# Patient Record
Sex: FEMALE | Race: OTHER | Employment: UNEMPLOYED | URBAN - METROPOLITAN AREA
[De-identification: names, ages, dates, MRNs, and addresses within clinical notes are randomized per-mention and may not be internally consistent; named-entity substitution may affect disease eponyms.]

---

## 2017-02-07 ENCOUNTER — OFFICE VISIT (OUTPATIENT)
Dept: FAMILY MEDICINE CLINIC | Facility: CLINIC | Age: 52
End: 2017-02-07

## 2017-02-07 VITALS
WEIGHT: 154 LBS | SYSTOLIC BLOOD PRESSURE: 184 MMHG | BODY MASS INDEX: 30 KG/M2 | DIASTOLIC BLOOD PRESSURE: 120 MMHG | HEART RATE: 73 BPM | TEMPERATURE: 98 F

## 2017-02-07 DIAGNOSIS — I10 ESSENTIAL HYPERTENSION: ICD-10-CM

## 2017-02-07 DIAGNOSIS — N30.00 ACUTE CYSTITIS WITHOUT HEMATURIA: ICD-10-CM

## 2017-02-07 DIAGNOSIS — Z12.39 BREAST CANCER SCREENING: ICD-10-CM

## 2017-02-07 DIAGNOSIS — M54.6 ACUTE RIGHT-SIDED THORACIC BACK PAIN: Primary | ICD-10-CM

## 2017-02-07 LAB
APPEARANCE: CLEAR
BILIRUBIN: NEGATIVE
GLUCOSE (URINE DIPSTICK): NEGATIVE MG/DL
KETONES (URINE DIPSTICK): NEGATIVE MG/DL
MULTISTIX LOT#: ABNORMAL NUMERIC
NITRITE, URINE: NEGATIVE
OCCULT BLOOD: NEGATIVE
PH, URINE: 8.5 (ref 4.5–8)
SPECIFIC GRAVITY: 1 (ref 1–1.03)
URINE-COLOR: YELLOW
UROBILINOGEN,SEMI-QN: 0.2 MG/DL (ref 0–1.9)

## 2017-02-07 PROCEDURE — 99213 OFFICE O/P EST LOW 20 MIN: CPT | Performed by: FAMILY MEDICINE

## 2017-02-07 PROCEDURE — 81002 URINALYSIS NONAUTO W/O SCOPE: CPT | Performed by: FAMILY MEDICINE

## 2017-02-07 PROCEDURE — 99214 OFFICE O/P EST MOD 30 MIN: CPT | Performed by: FAMILY MEDICINE

## 2017-02-07 RX ORDER — OMEGA-3-ACID ETHYL ESTERS 1 G/1
CAPSULE, LIQUID FILLED ORAL
Refills: 4 | COMMUNITY
Start: 2016-11-23 | End: 2018-07-19

## 2017-02-07 RX ORDER — CIPROFLOXACIN 500 MG/1
500 TABLET, FILM COATED ORAL 2 TIMES DAILY
Qty: 20 TABLET | Refills: 0 | Status: SHIPPED | OUTPATIENT
Start: 2017-02-07 | End: 2017-02-17

## 2017-02-07 RX ORDER — CYCLOBENZAPRINE HCL 10 MG
10 TABLET ORAL 3 TIMES DAILY
Qty: 30 TABLET | Refills: 1 | Status: SHIPPED | OUTPATIENT
Start: 2017-02-07 | End: 2017-02-27

## 2017-02-07 RX ORDER — IBUPROFEN 600 MG/1
600 TABLET ORAL EVERY 6 HOURS PRN
Qty: 60 TABLET | Refills: 0 | Status: SHIPPED | OUTPATIENT
Start: 2017-02-07 | End: 2017-02-27

## 2017-02-07 RX ORDER — CAPTOPRIL 25 MG/1
25 TABLET ORAL 2 TIMES DAILY
COMMUNITY
End: 2017-02-27

## 2017-02-07 RX ORDER — VALSARTAN 80 MG/1
TABLET ORAL
Qty: 30 TABLET | Refills: 2 | Status: SHIPPED | OUTPATIENT
Start: 2017-02-07 | End: 2017-09-24

## 2017-02-07 NOTE — PROGRESS NOTES
2/7/2017  10:06 AM    Melissa Naranjo is a 46year old female. Chief complaint(s): Patient presents with:  Back Pain: Patient here c/o right lower back pain. Started 4 days ago. She has been using Ibuprofen with some relief.      HPI:     Igor Lopez Alcohol Use: No                 Immunizations:     Immunization History  Administered            Date(s) Administered    Fluvirin, 3 Years & >, Im                          11/17/2012      Influenza Vaccine, No Preserv, 3YR +                          10/17/ Ear: External ear normal.   Left Ear: External ear normal.   Nose: No rhinorrhea. Mouth/Throat: Oropharynx is clear and moist.   Eyes: Conjunctivae are normal.   Neck: Neck supple. Cardiovascular: Normal rate and regular rhythm.     Pulmonary/Chest: Eff it with food. Cyclobenzaprine HCl 10 MG Oral Tab 30 tablet 1      Sig: Take 1 tablet (10 mg total) by mouth 3 (three) times daily. Lexi Benitez    LABORATORY & ORDERS:   Orders Placed This Encounter  POC Urinalysis, Manual Dip without microscopy [[id number]]  Ur concerns. Notify Dr Neeta Partida or the [facility] if there is a deterioration or worsening of the medical condition. Also, inform the doctor with any new symptoms or medications' side effects. Self breast exams every month.     FOLLOW-UP: Schedule a foll

## 2017-02-27 ENCOUNTER — OFFICE VISIT (OUTPATIENT)
Dept: FAMILY MEDICINE CLINIC | Facility: CLINIC | Age: 52
End: 2017-02-27

## 2017-02-27 VITALS
WEIGHT: 155 LBS | DIASTOLIC BLOOD PRESSURE: 88 MMHG | SYSTOLIC BLOOD PRESSURE: 140 MMHG | HEART RATE: 88 BPM | TEMPERATURE: 98 F | BODY MASS INDEX: 30 KG/M2

## 2017-02-27 DIAGNOSIS — I10 ESSENTIAL HYPERTENSION: Primary | ICD-10-CM

## 2017-02-27 DIAGNOSIS — M54.6 ACUTE RIGHT-SIDED THORACIC BACK PAIN: ICD-10-CM

## 2017-02-27 PROCEDURE — 99214 OFFICE O/P EST MOD 30 MIN: CPT | Performed by: FAMILY MEDICINE

## 2017-02-27 PROCEDURE — 99212 OFFICE O/P EST SF 10 MIN: CPT | Performed by: FAMILY MEDICINE

## 2017-02-27 NOTE — PROGRESS NOTES
2/27/2017  4:49 PM    Raysa Melendez is a 46year old female. Chief complaint(s): Patient presents with: Follow - Up  Back Pain    HPI:     Paigesilvio Arreolaeger primary complaint is regarding back pain.      Raysa Melendez is a 46 year 11/17/2012      Influenza Vaccine, No Preserv, 3YR +                          10/17/2016      TD                    04/09/2011      TDAP                  08/20/2016      Medications (Active prior to today's visit):    Current Outpatient Prescriptions:  Om 02/07/17  -URINALYSIS NONAUTO W/O SCOPE   Result Value Ref Range   GLUCOSE (URINE DIPSTICK) NEGATIVE Negative mg/dL   BILIRUBIN NEGATIVE Negative   KETONES (URINE DIPSTICK) NEGATIVE Negative mg/dL   SPECIFIC GRAVITY 1.000 (A) 1.005 - 1.030   OCCULT BLOOD N this encounter.        Meds This Visit:    No prescriptions requested or ordered in this encounter    Imaging & Referrals:  None         Levon Hurd MD

## 2017-03-15 ENCOUNTER — APPOINTMENT (OUTPATIENT)
Dept: CARDIOLOGY | Facility: CLINIC | Age: 52
End: 2017-03-15

## 2017-07-18 ENCOUNTER — HOSPITAL ENCOUNTER (OUTPATIENT)
Dept: MAMMOGRAPHY | Age: 52
Discharge: HOME | End: 2017-07-18
Attending: FAMILY MEDICINE
Payer: COMMERCIAL

## 2017-07-18 DIAGNOSIS — Z12.39 BREAST CANCER SCREENING: ICD-10-CM

## 2017-07-18 PROCEDURE — 77067 SCR MAMMO BI INCL CAD: CPT | Performed by: FAMILY MEDICINE

## 2017-09-24 RX ORDER — VALSARTAN 80 MG/1
TABLET ORAL
Qty: 90 TABLET | Refills: 1 | Status: SHIPPED | OUTPATIENT
Start: 2017-09-24 | End: 2018-03-07

## 2018-02-14 ENCOUNTER — OFFICE VISIT (OUTPATIENT)
Dept: FAMILY MEDICINE CLINIC | Facility: CLINIC | Age: 53
End: 2018-02-14

## 2018-02-14 ENCOUNTER — LAB ENCOUNTER (OUTPATIENT)
Dept: LAB | Age: 53
End: 2018-02-14
Attending: FAMILY MEDICINE
Payer: COMMERCIAL

## 2018-02-14 ENCOUNTER — APPOINTMENT (OUTPATIENT)
Dept: LAB | Age: 53
End: 2018-02-14
Attending: FAMILY MEDICINE
Payer: COMMERCIAL

## 2018-02-14 VITALS
BODY MASS INDEX: 30.43 KG/M2 | HEART RATE: 69 BPM | WEIGHT: 155 LBS | SYSTOLIC BLOOD PRESSURE: 173 MMHG | TEMPERATURE: 98 F | DIASTOLIC BLOOD PRESSURE: 113 MMHG | HEIGHT: 60 IN

## 2018-02-14 DIAGNOSIS — Z00.00 PHYSICAL EXAM: Primary | ICD-10-CM

## 2018-02-14 DIAGNOSIS — Z00.00 PHYSICAL EXAM: ICD-10-CM

## 2018-02-14 DIAGNOSIS — Z12.11 COLON CANCER SCREENING: ICD-10-CM

## 2018-02-14 DIAGNOSIS — I10 ESSENTIAL HYPERTENSION: ICD-10-CM

## 2018-02-14 LAB
25(OH)D3 SERPL-MCNC: 20.1 NG/ML
ALBUMIN SERPL BCP-MCNC: 4.1 G/DL (ref 3.5–4.8)
ALBUMIN/GLOB SERPL: 1.3 {RATIO} (ref 1–2)
ALP SERPL-CCNC: 81 U/L (ref 32–100)
ALT SERPL-CCNC: 19 U/L (ref 14–54)
ANION GAP SERPL CALC-SCNC: 9 MMOL/L (ref 0–18)
AST SERPL-CCNC: 18 U/L (ref 15–41)
BASOPHILS # BLD: 0.1 K/UL (ref 0–0.2)
BASOPHILS NFR BLD: 1 %
BILIRUB SERPL-MCNC: 0.4 MG/DL (ref 0.3–1.2)
BILIRUB UR QL: NEGATIVE
BUN SERPL-MCNC: 14 MG/DL (ref 8–20)
BUN/CREAT SERPL: 14.3 (ref 10–20)
CALCIUM SERPL-MCNC: 9.5 MG/DL (ref 8.5–10.5)
CANCER AG125 SERPL-ACNC: 15 U/ML (ref 0–35)
CHLORIDE SERPL-SCNC: 105 MMOL/L (ref 95–110)
CHOLEST SERPL-MCNC: 229 MG/DL (ref 110–200)
CLARITY UR: CLEAR
CO2 SERPL-SCNC: 26 MMOL/L (ref 22–32)
COLOR UR: YELLOW
CREAT SERPL-MCNC: 0.98 MG/DL (ref 0.5–1.5)
EOSINOPHIL # BLD: 0.2 K/UL (ref 0–0.7)
EOSINOPHIL NFR BLD: 5 %
ERYTHROCYTE [DISTWIDTH] IN BLOOD BY AUTOMATED COUNT: 13.4 % (ref 11–15)
GLOBULIN PLAS-MCNC: 3.1 G/DL (ref 2.5–3.7)
GLUCOSE SERPL-MCNC: 87 MG/DL (ref 70–99)
GLUCOSE UR-MCNC: NEGATIVE MG/DL
HBA1C MFR BLD: 5.7 % (ref 4–6)
HCT VFR BLD AUTO: 41.7 % (ref 35–48)
HDLC SERPL-MCNC: 46 MG/DL
HGB BLD-MCNC: 14 G/DL (ref 12–16)
KETONES UR-MCNC: NEGATIVE MG/DL
LDLC SERPL CALC-MCNC: 155 MG/DL (ref 0–99)
LYMPHOCYTES # BLD: 1.5 K/UL (ref 1–4)
LYMPHOCYTES NFR BLD: 33 %
MCH RBC QN AUTO: 28.1 PG (ref 27–32)
MCHC RBC AUTO-ENTMCNC: 33.6 G/DL (ref 32–37)
MCV RBC AUTO: 83.8 FL (ref 80–100)
MONOCYTES # BLD: 0.3 K/UL (ref 0–1)
MONOCYTES NFR BLD: 7 %
NEUTROPHILS # BLD AUTO: 2.5 K/UL (ref 1.8–7.7)
NEUTROPHILS NFR BLD: 54 %
NITRITE UR QL STRIP.AUTO: NEGATIVE
NONHDLC SERPL-MCNC: 183 MG/DL
OSMOLALITY UR CALC.SUM OF ELEC: 290 MOSM/KG (ref 275–295)
PATIENT FASTING: YES
PH UR: 5 [PH] (ref 5–8)
PLATELET # BLD AUTO: 244 K/UL (ref 140–400)
PMV BLD AUTO: 7.9 FL (ref 7.4–10.3)
POTASSIUM SERPL-SCNC: 3.9 MMOL/L (ref 3.3–5.1)
PROT SERPL-MCNC: 7.2 G/DL (ref 5.9–8.4)
PROT UR-MCNC: NEGATIVE MG/DL
RBC # BLD AUTO: 4.98 M/UL (ref 3.7–5.4)
RBC #/AREA URNS AUTO: 1 /HPF
RBC #/AREA URNS AUTO: 1 /HPF
SODIUM SERPL-SCNC: 140 MMOL/L (ref 136–144)
SP GR UR STRIP: 1.01 (ref 1–1.03)
TRIGL SERPL-MCNC: 140 MG/DL (ref 1–149)
TSH SERPL-ACNC: 2.86 UIU/ML (ref 0.45–5.33)
UROBILINOGEN UR STRIP-ACNC: <2
VIT C UR-MCNC: NEGATIVE MG/DL
WBC # BLD AUTO: 4.7 K/UL (ref 4–11)
WBC #/AREA URNS AUTO: 1 /HPF
WBC #/AREA URNS AUTO: 2 /HPF

## 2018-02-14 PROCEDURE — 84443 ASSAY THYROID STIM HORMONE: CPT

## 2018-02-14 PROCEDURE — 80053 COMPREHEN METABOLIC PANEL: CPT

## 2018-02-14 PROCEDURE — 82306 VITAMIN D 25 HYDROXY: CPT | Performed by: FAMILY MEDICINE

## 2018-02-14 PROCEDURE — 99396 PREV VISIT EST AGE 40-64: CPT | Performed by: FAMILY MEDICINE

## 2018-02-14 PROCEDURE — 86304 IMMUNOASSAY TUMOR CA 125: CPT

## 2018-02-14 PROCEDURE — 85025 COMPLETE CBC W/AUTO DIFF WBC: CPT

## 2018-02-14 PROCEDURE — 93010 ELECTROCARDIOGRAM REPORT: CPT | Performed by: FAMILY MEDICINE

## 2018-02-14 PROCEDURE — 36415 COLL VENOUS BLD VENIPUNCTURE: CPT

## 2018-02-14 PROCEDURE — 81001 URINALYSIS AUTO W/SCOPE: CPT

## 2018-02-14 PROCEDURE — 83036 HEMOGLOBIN GLYCOSYLATED A1C: CPT

## 2018-02-14 PROCEDURE — 80061 LIPID PANEL: CPT

## 2018-02-14 PROCEDURE — 81015 MICROSCOPIC EXAM OF URINE: CPT | Performed by: FAMILY MEDICINE

## 2018-02-14 PROCEDURE — 93005 ELECTROCARDIOGRAM TRACING: CPT

## 2018-02-14 RX ORDER — ERGOCALCIFEROL 1.25 MG/1
50000 CAPSULE ORAL WEEKLY
Qty: 12 CAPSULE | Refills: 4 | Status: SHIPPED | OUTPATIENT
Start: 2018-02-14 | End: 2018-03-16

## 2018-02-14 RX ORDER — BENAZEPRIL HYDROCHLORIDE 20 MG/1
20 TABLET ORAL DAILY
Qty: 90 TABLET | Refills: 1 | Status: SHIPPED | OUTPATIENT
Start: 2018-02-14 | End: 2018-07-19

## 2018-02-14 NOTE — PROGRESS NOTES
2/14/2018  8:25 AM    Sinan Escalante is a 46year old female. Chief complaint(s): Patient presents with:  Routine Physical    HPI:     Sinan Escalante primary complaint is regarding CPE.      Sinan Escalante is a 46year old female Medications (Active prior to today's visit):    Current Outpatient Prescriptions:  Benazepril HCl (LOTENSIN) 20 MG Oral Tab Take 1 tablet (20 mg total) by mouth daily.  Disp: 90 tablet Rfl: 1   Omega-3-acid Ethyl Esters 1 g Oral Cap TAKE 2 CAPSULES BY M 30.27 kg/m²    HENT:   Normocephalic, Normal red light reflex bilaterally, pupils equally reactive to light and accommodation, none icteric sclera. Normal tympanic membranes with normal light reflex bilaterally.  Normal nasal septum, throat clear without l screening    Assessment and Plan:     [place] checkup as follows:    LABORATORY & ORDERS:   Orders Placed This Encounter      CBC W Differential W Platelet [E]      Comp Metabolic Panel (14) [E]      Hemoglobin A1C [E]      Lipid Pane Differential W Platelet [E]      Comp Metabolic Panel (14) [E]      Hemoglobin A1C [E]      Lipid Panel [E]      TSH W Reflex To Free T4 [E]      Urinalysis, Routine [E]      Urine Microscopic w Reflex CULTURE      Vitamin D, 25-Hydroxy [E]      -II

## 2018-02-15 LAB
C TRACH DNA SPEC QL NAA+PROBE: NEGATIVE
N GONORRHOEA DNA SPEC QL NAA+PROBE: NEGATIVE

## 2018-02-15 RX ORDER — OLOPATADINE HYDROCHLORIDE 1 MG/ML
2 SOLUTION/ DROPS OPHTHALMIC 2 TIMES DAILY
Qty: 1 BOTTLE | Refills: 4 | Status: SHIPPED | OUTPATIENT
Start: 2018-02-15 | End: 2018-07-19

## 2018-02-16 LAB — HPV I/H RISK 1 DNA SPEC QL NAA+PROBE: NEGATIVE

## 2018-02-17 ENCOUNTER — TELEPHONE (OUTPATIENT)
Dept: OTHER | Age: 53
End: 2018-02-17

## 2018-02-17 NOTE — TELEPHONE ENCOUNTER
Notes Recorded by Anh Smith RN on 2/17/2018 at 1:27 PM CST  2/17 No answer see telephone encounter.   ------    Notes Recorded by Kvng Chery MD on 2/16/2018 at 9:32 PM CST  Please call patient, results are within normal limits.  ------    No

## 2018-02-24 NOTE — TELEPHONE ENCOUNTER
Pt returned call, verified name and  with [employer] assistance agent # [id number]. Reviewed all test results and recommendations with pt per doctor's instructions. Pt has already picked up the Vitamin D prescription.  Pt had no further questions at this

## 2018-03-07 ENCOUNTER — OFFICE VISIT (OUTPATIENT)
Dept: FAMILY MEDICINE CLINIC | Facility: CLINIC | Age: 53
End: 2018-03-07

## 2018-03-07 VITALS
WEIGHT: 153 LBS | BODY MASS INDEX: 30 KG/M2 | TEMPERATURE: 98 F | SYSTOLIC BLOOD PRESSURE: 138 MMHG | DIASTOLIC BLOOD PRESSURE: 86 MMHG | HEART RATE: 66 BPM

## 2018-03-07 DIAGNOSIS — J11.1 INFLUENZA: Primary | ICD-10-CM

## 2018-03-07 PROCEDURE — 99212 OFFICE O/P EST SF 10 MIN: CPT | Performed by: FAMILY MEDICINE

## 2018-03-07 PROCEDURE — 99213 OFFICE O/P EST LOW 20 MIN: CPT | Performed by: FAMILY MEDICINE

## 2018-03-07 RX ORDER — FLUTICASONE PROPIONATE 50 MCG
2 SPRAY, SUSPENSION (ML) NASAL DAILY
Qty: 1 INHALER | Refills: 3 | Status: SHIPPED | OUTPATIENT
Start: 2018-03-07 | End: 2018-07-19 | Stop reason: ALTCHOICE

## 2018-03-07 RX ORDER — CODEINE PHOSPHATE AND GUAIFENESIN 10; 100 MG/5ML; MG/5ML
5 SOLUTION ORAL EVERY 6 HOURS PRN
Qty: 120 ML | Refills: 1 | Status: SHIPPED | OUTPATIENT
Start: 2018-03-07 | End: 2018-07-19 | Stop reason: ALTCHOICE

## 2018-03-07 RX ORDER — OSELTAMIVIR PHOSPHATE 75 MG/1
75 CAPSULE ORAL 2 TIMES DAILY
Qty: 10 CAPSULE | Refills: 0 | Status: SHIPPED | OUTPATIENT
Start: 2018-03-07 | End: 2018-07-19 | Stop reason: ALTCHOICE

## 2018-03-07 NOTE — PROGRESS NOTES
3/7/2018 10:27 AM    Sissy Interiano, : 11/15/1965  Patient presents with:  Cough: X 2 days  Sore Throat  Body ache and/or chills  Ear Pain    HPI:     Sissy Interiano is a 46year old female who presents for evaluation of a chief complain LEFT  2015: OTHER SURGICAL HISTORY      Comment: left breast core biopsy    Social History:     Social History  Social History   Marital status:   Spouse name: N/A    Years of education: N/A  Number of children: N/A     Occupational History  None on normocephalic, atraumatic  EYES: sclera non icteric bilateral, conjunctiva clear  EARS: TM  bilateral: normal  NOSE: nasal turbinates: pink, normal mucosa  THROAT: clear, without exudates and redness noted  LUNGS: clear to auscultation bilaterally; no rale Refill:  0      guaiFENesin-codeine (CHERATUSSIN AC) 100-10 MG/5ML Oral Solution          Sig: Take 5 mL by mouth every 6 (six) hours as needed for cough.           Dispense:  120 mL          Refill:  1      Fluticasone Propionate 50 MCG/ACT Nasal Suspens

## 2018-05-07 ENCOUNTER — EMERGENCY (EMERGENCY)
Facility: HOSPITAL | Age: 53
LOS: 1 days | Discharge: HOME | End: 2018-05-07
Attending: EMERGENCY MEDICINE
Payer: MEDICAID

## 2018-05-07 VITALS
WEIGHT: 160.06 LBS | SYSTOLIC BLOOD PRESSURE: 108 MMHG | RESPIRATION RATE: 20 BRPM | TEMPERATURE: 98 F | HEART RATE: 67 BPM | DIASTOLIC BLOOD PRESSURE: 63 MMHG | OXYGEN SATURATION: 100 %

## 2018-05-07 DIAGNOSIS — Z98.89 OTHER SPECIFIED POSTPROCEDURAL STATES: Chronic | ICD-10-CM

## 2018-05-07 LAB
ALBUMIN SERPL ELPH-MCNC: 3.3 G/DL — LOW (ref 3.5–5)
ALP SERPL-CCNC: 124 U/L — HIGH (ref 40–120)
ALT FLD-CCNC: 76 U/L DA — HIGH (ref 10–60)
ANION GAP SERPL CALC-SCNC: 5 MMOL/L — SIGNIFICANT CHANGE UP (ref 5–17)
APPEARANCE UR: CLEAR — SIGNIFICANT CHANGE UP
APPEARANCE UR: CLEAR — SIGNIFICANT CHANGE UP
AST SERPL-CCNC: 38 U/L — SIGNIFICANT CHANGE UP (ref 10–40)
BASOPHILS # BLD AUTO: 0.1 K/UL — SIGNIFICANT CHANGE UP (ref 0–0.2)
BASOPHILS NFR BLD AUTO: 1.5 % — SIGNIFICANT CHANGE UP (ref 0–2)
BILIRUB SERPL-MCNC: 0.5 MG/DL — SIGNIFICANT CHANGE UP (ref 0.2–1.2)
BILIRUB UR-MCNC: NEGATIVE — SIGNIFICANT CHANGE UP
BILIRUB UR-MCNC: NEGATIVE — SIGNIFICANT CHANGE UP
BUN SERPL-MCNC: 24 MG/DL — HIGH (ref 7–18)
CALCIUM SERPL-MCNC: 8.5 MG/DL — SIGNIFICANT CHANGE UP (ref 8.4–10.5)
CHLORIDE SERPL-SCNC: 106 MMOL/L — SIGNIFICANT CHANGE UP (ref 96–108)
CO2 SERPL-SCNC: 28 MMOL/L — SIGNIFICANT CHANGE UP (ref 22–31)
COLOR SPEC: YELLOW — SIGNIFICANT CHANGE UP
COLOR SPEC: YELLOW — SIGNIFICANT CHANGE UP
CREAT SERPL-MCNC: 1.51 MG/DL — HIGH (ref 0.5–1.3)
DIFF PNL FLD: ABNORMAL
DIFF PNL FLD: ABNORMAL
EOSINOPHIL # BLD AUTO: 0.2 K/UL — SIGNIFICANT CHANGE UP (ref 0–0.5)
EOSINOPHIL NFR BLD AUTO: 3.5 % — SIGNIFICANT CHANGE UP (ref 0–6)
EPI CELLS # UR: SIGNIFICANT CHANGE UP /HPF
EPI CELLS # UR: SIGNIFICANT CHANGE UP /HPF
GLUCOSE SERPL-MCNC: 245 MG/DL — HIGH (ref 70–99)
GLUCOSE UR QL: 50 MG/DL
GLUCOSE UR QL: 50 MG/DL
HCG UR QL: NEGATIVE — SIGNIFICANT CHANGE UP
HCT VFR BLD CALC: 39.9 % — SIGNIFICANT CHANGE UP (ref 34.5–45)
HGB BLD-MCNC: 12.7 G/DL — SIGNIFICANT CHANGE UP (ref 11.5–15.5)
KETONES UR-MCNC: NEGATIVE — SIGNIFICANT CHANGE UP
KETONES UR-MCNC: NEGATIVE — SIGNIFICANT CHANGE UP
LACTATE SERPL-SCNC: 1.2 MMOL/L — SIGNIFICANT CHANGE UP (ref 0.7–2)
LEUKOCYTE ESTERASE UR-ACNC: ABNORMAL
LEUKOCYTE ESTERASE UR-ACNC: ABNORMAL
LYMPHOCYTES # BLD AUTO: 2.4 K/UL — SIGNIFICANT CHANGE UP (ref 1–3.3)
LYMPHOCYTES # BLD AUTO: 46 % — HIGH (ref 13–44)
MCHC RBC-ENTMCNC: 28 PG — SIGNIFICANT CHANGE UP (ref 27–34)
MCHC RBC-ENTMCNC: 31.8 GM/DL — LOW (ref 32–36)
MCV RBC AUTO: 88 FL — SIGNIFICANT CHANGE UP (ref 80–100)
MONOCYTES # BLD AUTO: 0.4 K/UL — SIGNIFICANT CHANGE UP (ref 0–0.9)
MONOCYTES NFR BLD AUTO: 6.8 % — SIGNIFICANT CHANGE UP (ref 2–14)
NEUTROPHILS # BLD AUTO: 2.2 K/UL — SIGNIFICANT CHANGE UP (ref 1.8–7.4)
NEUTROPHILS NFR BLD AUTO: 42.2 % — LOW (ref 43–77)
NITRITE UR-MCNC: NEGATIVE — SIGNIFICANT CHANGE UP
NITRITE UR-MCNC: NEGATIVE — SIGNIFICANT CHANGE UP
PH UR: 7 — SIGNIFICANT CHANGE UP (ref 5–8)
PH UR: 7 — SIGNIFICANT CHANGE UP (ref 5–8)
PLATELET # BLD AUTO: 248 K/UL — SIGNIFICANT CHANGE UP (ref 150–400)
POTASSIUM SERPL-MCNC: 3.7 MMOL/L — SIGNIFICANT CHANGE UP (ref 3.5–5.3)
POTASSIUM SERPL-SCNC: 3.7 MMOL/L — SIGNIFICANT CHANGE UP (ref 3.5–5.3)
PROT SERPL-MCNC: 7 G/DL — SIGNIFICANT CHANGE UP (ref 6–8.3)
PROT UR-MCNC: 15
PROT UR-MCNC: NEGATIVE — SIGNIFICANT CHANGE UP
RBC # BLD: 4.53 M/UL — SIGNIFICANT CHANGE UP (ref 3.8–5.2)
RBC # FLD: 12.4 % — SIGNIFICANT CHANGE UP (ref 10.3–14.5)
RBC CASTS # UR COMP ASSIST: ABNORMAL /HPF (ref 0–2)
RBC CASTS # UR COMP ASSIST: ABNORMAL /HPF (ref 0–2)
SODIUM SERPL-SCNC: 139 MMOL/L — SIGNIFICANT CHANGE UP (ref 135–145)
SP GR SPEC: 1.01 — SIGNIFICANT CHANGE UP (ref 1.01–1.02)
SP GR SPEC: 1.01 — SIGNIFICANT CHANGE UP (ref 1.01–1.02)
UROBILINOGEN FLD QL: NEGATIVE — SIGNIFICANT CHANGE UP
UROBILINOGEN FLD QL: NEGATIVE — SIGNIFICANT CHANGE UP
WBC # BLD: 5.3 K/UL — SIGNIFICANT CHANGE UP (ref 3.8–10.5)
WBC # FLD AUTO: 5.3 K/UL — SIGNIFICANT CHANGE UP (ref 3.8–10.5)
WBC UR QL: SIGNIFICANT CHANGE UP /HPF (ref 0–5)
WBC UR QL: SIGNIFICANT CHANGE UP /HPF (ref 0–5)

## 2018-05-07 PROCEDURE — 74176 CT ABD & PELVIS W/O CONTRAST: CPT | Mod: 26

## 2018-05-07 PROCEDURE — 87086 URINE CULTURE/COLONY COUNT: CPT

## 2018-05-07 PROCEDURE — 99284 EMERGENCY DEPT VISIT MOD MDM: CPT | Mod: 25

## 2018-05-07 PROCEDURE — 99285 EMERGENCY DEPT VISIT HI MDM: CPT

## 2018-05-07 PROCEDURE — 74176 CT ABD & PELVIS W/O CONTRAST: CPT

## 2018-05-07 PROCEDURE — 96374 THER/PROPH/DIAG INJ IV PUSH: CPT

## 2018-05-07 PROCEDURE — 81025 URINE PREGNANCY TEST: CPT

## 2018-05-07 PROCEDURE — 80053 COMPREHEN METABOLIC PANEL: CPT

## 2018-05-07 PROCEDURE — 85027 COMPLETE CBC AUTOMATED: CPT

## 2018-05-07 PROCEDURE — 96375 TX/PRO/DX INJ NEW DRUG ADDON: CPT

## 2018-05-07 PROCEDURE — 83605 ASSAY OF LACTIC ACID: CPT

## 2018-05-07 PROCEDURE — 81001 URINALYSIS AUTO W/SCOPE: CPT

## 2018-05-07 PROCEDURE — 87040 BLOOD CULTURE FOR BACTERIA: CPT

## 2018-05-07 RX ORDER — SODIUM CHLORIDE 9 MG/ML
1000 INJECTION INTRAMUSCULAR; INTRAVENOUS; SUBCUTANEOUS ONCE
Qty: 0 | Refills: 0 | Status: COMPLETED | OUTPATIENT
Start: 2018-05-07 | End: 2018-05-07

## 2018-05-07 RX ORDER — SODIUM CHLORIDE 9 MG/ML
3 INJECTION INTRAMUSCULAR; INTRAVENOUS; SUBCUTANEOUS ONCE
Qty: 0 | Refills: 0 | Status: COMPLETED | OUTPATIENT
Start: 2018-05-07 | End: 2018-05-07

## 2018-05-07 RX ORDER — CEFTRIAXONE 500 MG/1
1 INJECTION, POWDER, FOR SOLUTION INTRAMUSCULAR; INTRAVENOUS ONCE
Qty: 0 | Refills: 0 | Status: COMPLETED | OUTPATIENT
Start: 2018-05-07 | End: 2018-05-07

## 2018-05-07 RX ORDER — KETOROLAC TROMETHAMINE 30 MG/ML
15 SYRINGE (ML) INJECTION ONCE
Qty: 0 | Refills: 0 | Status: DISCONTINUED | OUTPATIENT
Start: 2018-05-07 | End: 2018-05-07

## 2018-05-07 RX ADMIN — SODIUM CHLORIDE 3 MILLILITER(S): 9 INJECTION INTRAMUSCULAR; INTRAVENOUS; SUBCUTANEOUS at 10:00

## 2018-05-07 RX ADMIN — SODIUM CHLORIDE 1000 MILLILITER(S): 9 INJECTION INTRAMUSCULAR; INTRAVENOUS; SUBCUTANEOUS at 10:08

## 2018-05-07 RX ADMIN — Medication 15 MILLIGRAM(S): at 10:08

## 2018-05-07 RX ADMIN — CEFTRIAXONE 100 GRAM(S): 500 INJECTION, POWDER, FOR SOLUTION INTRAMUSCULAR; INTRAVENOUS at 10:08

## 2018-05-07 NOTE — ED PROVIDER NOTE - MEDICAL DECISION MAKING DETAILS
lt flank pain, f/c, nausea on po abx-CT, IV abx, UA reassess, concern for uti, renal stones lt flank pain, f/c, nausea on po abx-CT, IV abx, UA reassess, concern for uti, renal stones, back strain

## 2018-05-07 NOTE — ED PROVIDER NOTE - PROGRESS NOTE DETAILS
Educated pt on results. Pt aware of polycystic kidneys, avoid nsaids. Instructed to complete PO abx, f/u PMD. Answered q's.

## 2018-05-07 NOTE — ED PROVIDER NOTE - OBJECTIVE STATEMENT
53 yo F h/o DMII, UTI, Kidney Stones p/w left flank pain, dysuria x 1 week. Pt also c/o sharp pain to lt side abd, f/c over same time. Pt was seen at [place] [facility] [place] [facility] 3 days ago and started on PO abx. Pt went again yesterday and left after waiting. Pt relates h/o renal stones requiring procedure last year. No cp/sob/dizziness or vomiting. NKDA

## 2018-05-07 NOTE — ED ADULT NURSE NOTE - OBJECTIVE STATEMENT
Patient came to the ED a/o x 3 ambulates c/o left lower back pain. Patient states she is also having burning in urination.

## 2018-05-08 LAB
CULTURE RESULTS: SIGNIFICANT CHANGE UP
SPECIMEN SOURCE: SIGNIFICANT CHANGE UP

## 2018-05-12 LAB
CULTURE RESULTS: SIGNIFICANT CHANGE UP
CULTURE RESULTS: SIGNIFICANT CHANGE UP
SPECIMEN SOURCE: SIGNIFICANT CHANGE UP
SPECIMEN SOURCE: SIGNIFICANT CHANGE UP

## 2018-06-30 ENCOUNTER — INPATIENT (INPATIENT)
Facility: HOSPITAL | Age: 53
LOS: 3 days | Discharge: HOME | DRG: 690 | End: 2018-07-04
Attending: INTERNAL MEDICINE | Admitting: INTERNAL MEDICINE
Payer: MEDICAID

## 2018-06-30 VITALS
RESPIRATION RATE: 16 BRPM | HEART RATE: 100 BPM | SYSTOLIC BLOOD PRESSURE: 111 MMHG | WEIGHT: 177.91 LBS | OXYGEN SATURATION: 98 % | TEMPERATURE: 100 F | DIASTOLIC BLOOD PRESSURE: 77 MMHG

## 2018-06-30 DIAGNOSIS — Z98.89 OTHER SPECIFIED POSTPROCEDURAL STATES: Chronic | ICD-10-CM

## 2018-06-30 DIAGNOSIS — N12 TUBULO-INTERSTITIAL NEPHRITIS, NOT SPECIFIED AS ACUTE OR CHRONIC: ICD-10-CM

## 2018-06-30 DIAGNOSIS — E87.6 HYPOKALEMIA: ICD-10-CM

## 2018-06-30 DIAGNOSIS — E11.9 TYPE 2 DIABETES MELLITUS WITHOUT COMPLICATIONS: ICD-10-CM

## 2018-06-30 DIAGNOSIS — Z29.9 ENCOUNTER FOR PROPHYLACTIC MEASURES, UNSPECIFIED: ICD-10-CM

## 2018-06-30 DIAGNOSIS — Q61.3 POLYCYSTIC KIDNEY, UNSPECIFIED: ICD-10-CM

## 2018-06-30 DIAGNOSIS — N18.9 CHRONIC KIDNEY DISEASE, UNSPECIFIED: ICD-10-CM

## 2018-06-30 LAB
ANION GAP SERPL CALC-SCNC: 10 MMOL/L — SIGNIFICANT CHANGE UP (ref 5–17)
APPEARANCE UR: ABNORMAL
BASOPHILS # BLD AUTO: 0.1 K/UL — SIGNIFICANT CHANGE UP (ref 0–0.2)
BASOPHILS NFR BLD AUTO: 0.5 % — SIGNIFICANT CHANGE UP (ref 0–2)
BILIRUB UR-MCNC: NEGATIVE — SIGNIFICANT CHANGE UP
BUN SERPL-MCNC: 23 MG/DL — HIGH (ref 7–18)
CALCIUM SERPL-MCNC: 8.7 MG/DL — SIGNIFICANT CHANGE UP (ref 8.4–10.5)
CHLORIDE SERPL-SCNC: 105 MMOL/L — SIGNIFICANT CHANGE UP (ref 96–108)
CHOLEST SERPL-MCNC: 92 MG/DL — SIGNIFICANT CHANGE UP (ref 10–199)
CO2 SERPL-SCNC: 22 MMOL/L — SIGNIFICANT CHANGE UP (ref 22–31)
COLOR SPEC: YELLOW — SIGNIFICANT CHANGE UP
CREAT SERPL-MCNC: 1.67 MG/DL — HIGH (ref 0.5–1.3)
DIFF PNL FLD: ABNORMAL
EOSINOPHIL # BLD AUTO: 0.1 K/UL — SIGNIFICANT CHANGE UP (ref 0–0.5)
EOSINOPHIL NFR BLD AUTO: 0.5 % — SIGNIFICANT CHANGE UP (ref 0–6)
GLUCOSE BLDC GLUCOMTR-MCNC: 206 MG/DL — HIGH (ref 70–99)
GLUCOSE BLDC GLUCOMTR-MCNC: 212 MG/DL — HIGH (ref 70–99)
GLUCOSE BLDC GLUCOMTR-MCNC: 222 MG/DL — HIGH (ref 70–99)
GLUCOSE BLDC GLUCOMTR-MCNC: 223 MG/DL — HIGH (ref 70–99)
GLUCOSE SERPL-MCNC: 228 MG/DL — HIGH (ref 70–99)
GLUCOSE UR QL: NEGATIVE — SIGNIFICANT CHANGE UP
HCG SERPL-ACNC: 10 MIU/ML — HIGH
HCT VFR BLD CALC: 38.4 % — SIGNIFICANT CHANGE UP (ref 34.5–45)
HDLC SERPL-MCNC: 42 MG/DL — SIGNIFICANT CHANGE UP (ref 40–125)
HGB BLD-MCNC: 12.6 G/DL — SIGNIFICANT CHANGE UP (ref 11.5–15.5)
KETONES UR-MCNC: ABNORMAL
LEUKOCYTE ESTERASE UR-ACNC: ABNORMAL
LIPID PNL WITH DIRECT LDL SERPL: 29 MG/DL — SIGNIFICANT CHANGE UP
LYMPHOCYTES # BLD AUTO: 1.2 K/UL — SIGNIFICANT CHANGE UP (ref 1–3.3)
LYMPHOCYTES # BLD AUTO: 11.2 % — LOW (ref 13–44)
MCHC RBC-ENTMCNC: 28.9 PG — SIGNIFICANT CHANGE UP (ref 27–34)
MCHC RBC-ENTMCNC: 33 GM/DL — SIGNIFICANT CHANGE UP (ref 32–36)
MCV RBC AUTO: 87.7 FL — SIGNIFICANT CHANGE UP (ref 80–100)
MONOCYTES # BLD AUTO: 0.5 K/UL — SIGNIFICANT CHANGE UP (ref 0–0.9)
MONOCYTES NFR BLD AUTO: 5 % — SIGNIFICANT CHANGE UP (ref 2–14)
NEUTROPHILS # BLD AUTO: 8.7 K/UL — HIGH (ref 1.8–7.4)
NEUTROPHILS NFR BLD AUTO: 82.8 % — HIGH (ref 43–77)
NITRITE UR-MCNC: POSITIVE
PH UR: 6 — SIGNIFICANT CHANGE UP (ref 5–8)
PLATELET # BLD AUTO: 284 K/UL — SIGNIFICANT CHANGE UP (ref 150–400)
POTASSIUM SERPL-MCNC: 3.4 MMOL/L — LOW (ref 3.5–5.3)
POTASSIUM SERPL-SCNC: 3.4 MMOL/L — LOW (ref 3.5–5.3)
PROT UR-MCNC: 100
RBC # BLD: 4.38 M/UL — SIGNIFICANT CHANGE UP (ref 3.8–5.2)
RBC # FLD: 11.9 % — SIGNIFICANT CHANGE UP (ref 10.3–14.5)
SODIUM SERPL-SCNC: 137 MMOL/L — SIGNIFICANT CHANGE UP (ref 135–145)
SP GR SPEC: 1.01 — SIGNIFICANT CHANGE UP (ref 1.01–1.02)
TOTAL CHOLESTEROL/HDL RATIO MEASUREMENT: 2.2 RATIO — LOW (ref 3.3–7.1)
TRIGL SERPL-MCNC: 105 MG/DL — SIGNIFICANT CHANGE UP (ref 10–149)
TSH SERPL-MCNC: 0.31 UU/ML — LOW (ref 0.34–4.82)
UROBILINOGEN FLD QL: 1
WBC # BLD: 10.5 K/UL — SIGNIFICANT CHANGE UP (ref 3.8–10.5)
WBC # FLD AUTO: 10.5 K/UL — SIGNIFICANT CHANGE UP (ref 3.8–10.5)

## 2018-06-30 PROCEDURE — 74176 CT ABD & PELVIS W/O CONTRAST: CPT | Mod: 26

## 2018-06-30 PROCEDURE — 99285 EMERGENCY DEPT VISIT HI MDM: CPT | Mod: 25

## 2018-06-30 RX ORDER — ACETAMINOPHEN 500 MG
650 TABLET ORAL ONCE
Qty: 0 | Refills: 0 | Status: COMPLETED | OUTPATIENT
Start: 2018-06-30 | End: 2018-06-30

## 2018-06-30 RX ORDER — DEXTROSE 50 % IN WATER 50 %
25 SYRINGE (ML) INTRAVENOUS ONCE
Qty: 0 | Refills: 0 | Status: DISCONTINUED | OUTPATIENT
Start: 2018-06-30 | End: 2018-07-04

## 2018-06-30 RX ORDER — PIPERACILLIN AND TAZOBACTAM 4; .5 G/20ML; G/20ML
3.38 INJECTION, POWDER, LYOPHILIZED, FOR SOLUTION INTRAVENOUS EVERY 8 HOURS
Qty: 0 | Refills: 0 | Status: DISCONTINUED | OUTPATIENT
Start: 2018-06-30 | End: 2018-06-30

## 2018-06-30 RX ORDER — CEFTRIAXONE 500 MG/1
1 INJECTION, POWDER, FOR SOLUTION INTRAMUSCULAR; INTRAVENOUS EVERY 24 HOURS
Qty: 0 | Refills: 0 | Status: DISCONTINUED | OUTPATIENT
Start: 2018-07-01 | End: 2018-07-04

## 2018-06-30 RX ORDER — INSULIN LISPRO 100/ML
VIAL (ML) SUBCUTANEOUS
Qty: 0 | Refills: 0 | Status: DISCONTINUED | OUTPATIENT
Start: 2018-06-30 | End: 2018-07-04

## 2018-06-30 RX ORDER — SIMVASTATIN 20 MG/1
1 TABLET, FILM COATED ORAL
Qty: 0 | Refills: 0 | COMMUNITY

## 2018-06-30 RX ORDER — SODIUM CHLORIDE 9 MG/ML
1000 INJECTION, SOLUTION INTRAVENOUS
Qty: 0 | Refills: 0 | Status: DISCONTINUED | OUTPATIENT
Start: 2018-06-30 | End: 2018-07-04

## 2018-06-30 RX ORDER — INSULIN GLARGINE 100 [IU]/ML
10 INJECTION, SOLUTION SUBCUTANEOUS AT BEDTIME
Qty: 0 | Refills: 0 | Status: DISCONTINUED | OUTPATIENT
Start: 2018-06-30 | End: 2018-07-04

## 2018-06-30 RX ORDER — SODIUM CHLORIDE 9 MG/ML
3 INJECTION INTRAMUSCULAR; INTRAVENOUS; SUBCUTANEOUS ONCE
Qty: 0 | Refills: 0 | Status: COMPLETED | OUTPATIENT
Start: 2018-06-30 | End: 2018-06-30

## 2018-06-30 RX ORDER — DEXTROSE 50 % IN WATER 50 %
15 SYRINGE (ML) INTRAVENOUS ONCE
Qty: 0 | Refills: 0 | Status: DISCONTINUED | OUTPATIENT
Start: 2018-06-30 | End: 2018-07-04

## 2018-06-30 RX ORDER — SITAGLIPTIN 50 MG/1
1 TABLET, FILM COATED ORAL
Qty: 0 | Refills: 0 | COMMUNITY

## 2018-06-30 RX ORDER — CEFTRIAXONE 500 MG/1
1 INJECTION, POWDER, FOR SOLUTION INTRAMUSCULAR; INTRAVENOUS ONCE
Qty: 0 | Refills: 0 | Status: COMPLETED | OUTPATIENT
Start: 2018-06-30 | End: 2018-06-30

## 2018-06-30 RX ORDER — SODIUM CHLORIDE 9 MG/ML
1000 INJECTION INTRAMUSCULAR; INTRAVENOUS; SUBCUTANEOUS ONCE
Qty: 0 | Refills: 0 | Status: COMPLETED | OUTPATIENT
Start: 2018-06-30 | End: 2018-06-30

## 2018-06-30 RX ORDER — LISINOPRIL 2.5 MG/1
1 TABLET ORAL
Qty: 0 | Refills: 0 | COMMUNITY

## 2018-06-30 RX ORDER — SIMVASTATIN 20 MG/1
10 TABLET, FILM COATED ORAL AT BEDTIME
Qty: 0 | Refills: 0 | Status: DISCONTINUED | OUTPATIENT
Start: 2018-06-30 | End: 2018-07-04

## 2018-06-30 RX ORDER — SODIUM CHLORIDE 9 MG/ML
1000 INJECTION INTRAMUSCULAR; INTRAVENOUS; SUBCUTANEOUS
Qty: 0 | Refills: 0 | Status: DISCONTINUED | OUTPATIENT
Start: 2018-06-30 | End: 2018-07-04

## 2018-06-30 RX ORDER — ASPIRIN/CALCIUM CARB/MAGNESIUM 324 MG
1 TABLET ORAL
Qty: 0 | Refills: 0 | COMMUNITY

## 2018-06-30 RX ORDER — REPAGLINIDE 1 MG/1
1 TABLET ORAL
Qty: 0 | Refills: 0 | COMMUNITY

## 2018-06-30 RX ORDER — GLUCAGON INJECTION, SOLUTION 0.5 MG/.1ML
1 INJECTION, SOLUTION SUBCUTANEOUS ONCE
Qty: 0 | Refills: 0 | Status: DISCONTINUED | OUTPATIENT
Start: 2018-06-30 | End: 2018-07-04

## 2018-06-30 RX ORDER — KETOROLAC TROMETHAMINE 30 MG/ML
30 SYRINGE (ML) INJECTION ONCE
Qty: 0 | Refills: 0 | Status: DISCONTINUED | OUTPATIENT
Start: 2018-06-30 | End: 2018-06-30

## 2018-06-30 RX ORDER — LISINOPRIL 2.5 MG/1
5 TABLET ORAL DAILY
Qty: 0 | Refills: 0 | Status: DISCONTINUED | OUTPATIENT
Start: 2018-06-30 | End: 2018-07-04

## 2018-06-30 RX ORDER — POTASSIUM CHLORIDE 20 MEQ
40 PACKET (EA) ORAL ONCE
Qty: 0 | Refills: 0 | Status: COMPLETED | OUTPATIENT
Start: 2018-06-30 | End: 2018-06-30

## 2018-06-30 RX ORDER — DEXTROSE 50 % IN WATER 50 %
12.5 SYRINGE (ML) INTRAVENOUS ONCE
Qty: 0 | Refills: 0 | Status: DISCONTINUED | OUTPATIENT
Start: 2018-06-30 | End: 2018-07-04

## 2018-06-30 RX ADMIN — SODIUM CHLORIDE 3 MILLILITER(S): 9 INJECTION INTRAMUSCULAR; INTRAVENOUS; SUBCUTANEOUS at 02:33

## 2018-06-30 RX ADMIN — SIMVASTATIN 10 MILLIGRAM(S): 20 TABLET, FILM COATED ORAL at 22:40

## 2018-06-30 RX ADMIN — SODIUM CHLORIDE 3000 MILLILITER(S): 9 INJECTION INTRAMUSCULAR; INTRAVENOUS; SUBCUTANEOUS at 02:34

## 2018-06-30 RX ADMIN — Medication 650 MILLIGRAM(S): at 05:52

## 2018-06-30 RX ADMIN — Medication 2: at 17:00

## 2018-06-30 RX ADMIN — Medication 30 MILLIGRAM(S): at 02:31

## 2018-06-30 RX ADMIN — CEFTRIAXONE 100 GRAM(S): 500 INJECTION, POWDER, FOR SOLUTION INTRAMUSCULAR; INTRAVENOUS at 04:37

## 2018-06-30 RX ADMIN — Medication 40 MILLIEQUIVALENT(S): at 11:03

## 2018-06-30 RX ADMIN — PIPERACILLIN AND TAZOBACTAM 25 GRAM(S): 4; .5 INJECTION, POWDER, LYOPHILIZED, FOR SOLUTION INTRAVENOUS at 05:52

## 2018-06-30 RX ADMIN — Medication 30 MILLIGRAM(S): at 02:34

## 2018-06-30 RX ADMIN — Medication 2: at 11:54

## 2018-06-30 RX ADMIN — INSULIN GLARGINE 10 UNIT(S): 100 INJECTION, SOLUTION SUBCUTANEOUS at 23:53

## 2018-06-30 RX ADMIN — SODIUM CHLORIDE 125 MILLILITER(S): 9 INJECTION INTRAMUSCULAR; INTRAVENOUS; SUBCUTANEOUS at 06:29

## 2018-06-30 NOTE — H&P ADULT - PROBLEM SELECTOR PLAN 6
IMPROVE VTE Individual Risk Assessment    RISK                                                          Points  [] Previous VTE                                           3  [] Thrombophilia                                        2  [] Lower limb paralysis                              2   [] Current Cancer                                       2   [x] Immobilization > 24 hrs                        1  [] ICU/CCU stay > 24 hours                       1  [x] Age > 60                                                   1    IMPROVE VTE Score: 2    Lovenox

## 2018-06-30 NOTE — H&P ADULT - PROBLEM SELECTOR PLAN 1
presents with dysuria, suprapubic pain, not responding to PO   Abd CT: shows PCKD and Left pyelonephritis  IVF  Rocephin   f/u Ucx

## 2018-06-30 NOTE — ED PROVIDER NOTE - MEDICAL DECISION MAKING DETAILS
Pt with upper UTI and renal failure, febrile, will admit for IV hydration and abx. MAR endorsed. Pt agrees with admission. I had a detailed discussion with the patient and/or guardian regarding the historical points, exam findings, and any diagnostic results supporting the admit diagnosis.

## 2018-06-30 NOTE — CONSULT NOTE ADULT - SUBJECTIVE AND OBJECTIVE BOX
Patient is a 52y Female whom presented to the hospital with   L   FLANK  PAIN  AND   CHILLS     HAS  MED  HISTORY  SIG  FOR  ADPCKD,   IS  BEING  FOLLOWED  BY   DR STUBBS AT  [facility],     H/O  DMII,   ,  BR  ASTHMA,  L  KIDNEY   STONES    STARTED  HAVING  L  FLANK  PAIN  RADIATING  TO  ANT  ABD  ASSOCIATED  WITH   CHILLS   SO   WENT TO  [facility],  ,  HAD  TOO LONG  A WAIT  IN   ER , SO  CAME HERE    PAST MEDICAL & SURGICAL HISTORY:  Polycystic kidney disease  Renal colic  Diabetes  Asthma  S/P     No Known Allergies    Home Medications Reviewed  Hospital Medications:   MEDICATIONS  (STANDING):  dextrose 5%. 1000 milliLiter(s) (50 mL/Hr) IV Continuous <Continuous>  dextrose 50% Injectable 12.5 Gram(s) IV Push once  dextrose 50% Injectable 25 Gram(s) IV Push once  dextrose 50% Injectable 25 Gram(s) IV Push once  insulin lispro (HumaLOG) corrective regimen sliding scale   SubCutaneous Before meals and at bedtime  sodium chloride 0.9%. 1000 milliLiter(s) (125 mL/Hr) IV Continuous <Continuous>    SOCIAL HISTORY:  Denies ETOh,Smoking,   FAMILY HISTORY:  Family history of polycystic kidney (Sibling)    REVIEW OF SYSTEMS:   HAS  CHILLS   ,  DIDNT  NOTICE  ANY   FEVER   NO  COUGH  OR  SOB    APPETITE IS OK    NO  N/V   IS PASSING URINE  THOUGH  GET  PAIN   WHILE  URINATING  FLANK  PAIN  S TILL   THERE  THOUGH  BETTER     VITALS:  T(F): 97.8 (18 @ 07:18), Max: 100.4 (18 @ 01:49)  HR: 78 (18 @ 07:18)  BP: 100/49 (18 @ 07:18)  RR: 20 (18 @ 07:18)  SpO2: 98% (18 @ 07:18)  Wt(kg): --      Weight (kg): 80.7 ( @ 01:49)  PHYSICAL EXAM:  Constitutional: NAD  Neck: No JVD  Respiratory: CTAB, no wheezes, rales or rhonchi  Cardiovascular: S1, S2, RRR  Gastrointestinal: BS+, soft, NT/ND  Extremities:  No peripheral edema  Neurological: A/O x 3,   : CVA tenderness PRESENT ON  THE  L  SIDE,. No larson.   LABS:      137  |  105  |  23<H>  ----------------------------<  228<H>  3.4<L>   |  22  |  1.67<H>    Ca    8.7      2018 02:43      Creatinine Trend: 1.67 <--                        12.6   10.5  )-----------( 284      ( 2018 02:43 )             38.4     Urine Studies:  Urinalysis Basic - ( 2018 04:02 )    Color: Yellow / Appearance: Slightly Turbid / S.010 / pH:   Gluc:  / Ketone: Trace  / Bili: Negative / Urobili: 1   Blood:  / Protein: 100 / Nitrite: Positive   Leuk Esterase: Moderate / RBC: 10-25 /HPF / WBC 11-25 /HPF   Sq Epi:  / Non Sq Epi: Few /HPF / Bacteria: Many /HPF        RADIOLOGY & ADDITIONAL STUDIES:

## 2018-06-30 NOTE — ED PROVIDER NOTE - CARE PLAN
Principal Discharge DX:	Pyelonephritis  Secondary Diagnosis:	Renal failure  Secondary Diagnosis:	Polycystic kidney

## 2018-06-30 NOTE — PROGRESS NOTE ADULT - ASSESSMENT
seen and examined  adm dx ptelo  ua pos plus lt renal angle tenderness   also ct is positive.   on iv rocephine   pt is obese and diabetic  Nutrition eval

## 2018-06-30 NOTE — PROGRESS NOTE ADULT - SUBJECTIVE AND OBJECTIVE BOX
HPI:  53 yo F w/ PMH  D I, UTI, left  Kidney Stones s/p lithotripsy , Adult PCKD,  p/w left flank pain, dysuria x 1 week. Pt was here on 18 for similar complaints and was discharge on PO abx. PT does not remember which antibiotic but states that completed about 7 days.  Symptoms improve but then again started again. Pt went to [facility] but due to long wait left the hospital. Today she comes again with dysuria, suprapubic pan and left flank pain.  At Ed pt was found to have positive UA. Abd CT shows concerns of left sided pyelonephritis. Pt denies CP, SOB, N/V, diarrhea or any other complaints.    Pt does not know medication, will bring later (2018 09:55)      Patient is a 52y old  Female who presents with a chief complaint of dysuria and left flank pain (2018 09:55)      INTERVAL HPI/OVERNIGHT EVENTS:  T(C): 36.6 (18 @ 07:18), Max: 38 (18 @ 01:49)  HR: 78 (18 @ 07:18) (78 - 100)  BP: 100/49 (18 @ 07:18) (100/49 - 125/58)  RR: 20 (18 @ 07:18) (16 - 20)  SpO2: 98% (18 @ 07:18) (97% - 99%)  Wt(kg): --  I&O's Summary      REVIEW OF SYSTEMS: denies fever, chills, SOB, palpitations, chest pain, abdominal pain, nausea, vomitting, diarrhea, constipation, dizziness    MEDICATIONS  (STANDING):  dextrose 5%. 1000 milliLiter(s) (50 mL/Hr) IV Continuous <Continuous>  dextrose 50% Injectable 12.5 Gram(s) IV Push once  dextrose 50% Injectable 25 Gram(s) IV Push once  dextrose 50% Injectable 25 Gram(s) IV Push once  insulin lispro (HumaLOG) corrective regimen sliding scale   SubCutaneous Before meals and at bedtime  sodium chloride 0.9%. 1000 milliLiter(s) (125 mL/Hr) IV Continuous <Continuous>    MEDICATIONS  (PRN):  dextrose 40% Gel 15 Gram(s) Oral once PRN Blood Glucose LESS THAN 70 milliGRAM(s)/deciLiter  glucagon  Injectable 1 milliGRAM(s) IntraMuscular once PRN Glucose <70 milliGRAM(s)/deciLiter      PHYSICAL EXAM:  GENERAL: NAD, well-groomed, well-developed  HEAD:  Atraumatic, Normocephalic  EYES: EOMI, PERRLA, conjunctiva and sclera clear  ENMT: No tonsillar erythema, exudates, or enlargement; Moist mucous membranes, Good dentition, No lesions  NECK: Supple, No JVD, Normal thyroid  NERVOUS SYSTEM:  Alert & Oriented X3, Good concentration; Motor Strength 5/5 B/L upper and lower extremities; DTRs 2+ intact and symmetric  CHEST/LUNG: Clear to percussion bilaterally; No rales, rhonchi, wheezing, or rubs  HEART: Regular rate and rhythm; No murmurs, rubs, or gallops  ABDOMEN: Soft, Nontender, Nondistended; Bowel sounds present  EXTREMITIES:  2+ Peripheral Pulses, No clubbing, cyanosis, or edema  LYMPH: No lymphadenopathy noted  SKIN: No rashes or lesions  LABS:                        12.6   10.5  )-----------( 284      ( 2018 02:43 )             38.4     06-30    137  |  105  |  23<H>  ----------------------------<  228<H>  3.4<L>   |  22  |  1.67<H>    Ca    8.7      2018 02:43        Urinalysis Basic - ( 2018 04:02 )    Color: Yellow / Appearance: Slightly Turbid / S.010 / pH: x  Gluc: x / Ketone: Trace  / Bili: Negative / Urobili: 1   Blood: x / Protein: 100 / Nitrite: Positive   Leuk Esterase: Moderate / RBC: 10-25 /HPF / WBC 11-25 /HPF   Sq Epi: x / Non Sq Epi: Few /HPF / Bacteria: Many /HPF      CAPILLARY BLOOD GLUCOSE      POCT Blood Glucose.: 212 mg/dL (2018 11:51)  POCT Blood Glucose.: 223 mg/dL (2018 08:14)        Urinalysis Basic - ( 2018 04:02 )    Color: Yellow / Appearance: Slightly Turbid / S.010 / pH: x  Gluc: x / Ketone: Trace  / Bili: Negative / Urobili: 1   Blood: x / Protein: 100 / Nitrite: Positive   Leuk Esterase: Moderate / RBC: 10-25 /HPF / WBC 11-25 /HPF   Sq Epi: x / Non Sq Epi: Few /HPF / Bacteria: Many /HPF

## 2018-06-30 NOTE — H&P ADULT - ASSESSMENT
53 yo F w/ PMH  D I, UTI, left  Kidney Stones s/p lithotripsy , Adult PCKD,  p/w left flank pain, dysuria x 1 week.  At ED pt was found to have positive UA. Abd CT shows concerns of left sided pyelonephritis.

## 2018-06-30 NOTE — H&P ADULT - NSHPPHYSICALEXAM_GEN_ALL_CORE
PHYSICAL EXAM:  GENERAL: NAD, speaks in full sentences, no signs of respiratory distress  HEENT:  Atraumatic, Normocephalic,  EOMI, PERRLA, conjunctiva and sclera clear  NECK: Supple, No JVD  LUNG: Clear to auscultation bilaterally; No wheeze; No crackles; No accessory muscles used  CVS: Regular rate and rhythm, no RMG  ABDOMEN: Soft, Nontender, lower abdominal pain, Nondistended; Bowel sounds present; No guarding  : suprapubic pain , left flank pain   EXTREMITIES:  2+ Peripheral Pulses, No cyanosis or edema  SKIN: No rashes or lesions  Neuro: AAOx3, non-focal

## 2018-06-30 NOTE — H&P ADULT - HISTORY OF PRESENT ILLNESS
51 yo F w/ PMH  D I, UTI, left  Kidney Stones s/p lithotripsy , Adult PCKD,  p/w left flank pain, dysuria x 1 week. Pt was here on 7/7/18 for similar complaints and was discharge on PO abx. PT does not remember which antibiotic but states that completed about 7 days.  Symptoms improve but then again started again. Pt went to [facility] but due to long wait left the hospital. Today she comes again with dysuria, suprapubic pan and left flank pain.  At Ed pt was found to have positive UA. Abd CT shows concerns of left sided pyelonephritis. Pt denies CP, SOB, N/V, diarrhea or any other complaints.    Pt does not know medication, will bring later 51 yo F w/ PMH  DM, UTI, left  Kidney Stones s/p lithotripsy , Adult PCKD,  p/w left flank pain, dysuria x 1 week. Pt was here on 7/7/18 for similar complaints and was discharge on PO abx. PT does not remember which antibiotic but states that completed about 7 days.  Symptoms improve but then again started again. Pt went to [facility] but due to long wait left the hospital. Today she comes again with dysuria, suprapubic pan and left flank pain.  At Ed pt was found to have positive UA. Abd CT shows concerns of left sided pyelonephritis. Pt denies CP, SOB, N/V, diarrhea or any other complaints.    LAst LMP 3 years ago

## 2018-07-01 LAB
ANION GAP SERPL CALC-SCNC: 7 MMOL/L — SIGNIFICANT CHANGE UP (ref 5–17)
BASOPHILS # BLD AUTO: 0 K/UL — SIGNIFICANT CHANGE UP (ref 0–0.2)
BASOPHILS NFR BLD AUTO: 0.4 % — SIGNIFICANT CHANGE UP (ref 0–2)
BUN SERPL-MCNC: 19 MG/DL — HIGH (ref 7–18)
CALCIUM SERPL-MCNC: 8.4 MG/DL — SIGNIFICANT CHANGE UP (ref 8.4–10.5)
CHLORIDE SERPL-SCNC: 108 MMOL/L — SIGNIFICANT CHANGE UP (ref 96–108)
CO2 SERPL-SCNC: 24 MMOL/L — SIGNIFICANT CHANGE UP (ref 22–31)
CREAT SERPL-MCNC: 1.69 MG/DL — HIGH (ref 0.5–1.3)
CULTURE RESULTS: NO GROWTH — SIGNIFICANT CHANGE UP
EOSINOPHIL # BLD AUTO: 0.3 K/UL — SIGNIFICANT CHANGE UP (ref 0–0.5)
EOSINOPHIL NFR BLD AUTO: 2.7 % — SIGNIFICANT CHANGE UP (ref 0–6)
FOLATE SERPL-MCNC: 16.9 NG/ML — SIGNIFICANT CHANGE UP
GLUCOSE BLDC GLUCOMTR-MCNC: 147 MG/DL — HIGH (ref 70–99)
GLUCOSE BLDC GLUCOMTR-MCNC: 191 MG/DL — HIGH (ref 70–99)
GLUCOSE BLDC GLUCOMTR-MCNC: 198 MG/DL — HIGH (ref 70–99)
GLUCOSE BLDC GLUCOMTR-MCNC: 222 MG/DL — HIGH (ref 70–99)
GLUCOSE SERPL-MCNC: 215 MG/DL — HIGH (ref 70–99)
HBA1C BLD-MCNC: 9.1 % — HIGH (ref 4–5.6)
HCT VFR BLD CALC: 35.9 % — SIGNIFICANT CHANGE UP (ref 34.5–45)
HGB BLD-MCNC: 12.4 G/DL — SIGNIFICANT CHANGE UP (ref 11.5–15.5)
LYMPHOCYTES # BLD AUTO: 2.1 K/UL — SIGNIFICANT CHANGE UP (ref 1–3.3)
LYMPHOCYTES # BLD AUTO: 21.8 % — SIGNIFICANT CHANGE UP (ref 13–44)
MAGNESIUM SERPL-MCNC: 2 MG/DL — SIGNIFICANT CHANGE UP (ref 1.6–2.6)
MCHC RBC-ENTMCNC: 30.5 PG — SIGNIFICANT CHANGE UP (ref 27–34)
MCHC RBC-ENTMCNC: 34.4 GM/DL — SIGNIFICANT CHANGE UP (ref 32–36)
MCV RBC AUTO: 88.4 FL — SIGNIFICANT CHANGE UP (ref 80–100)
MONOCYTES # BLD AUTO: 0.8 K/UL — SIGNIFICANT CHANGE UP (ref 0–0.9)
MONOCYTES NFR BLD AUTO: 7.8 % — SIGNIFICANT CHANGE UP (ref 2–14)
NEUTROPHILS # BLD AUTO: 6.6 K/UL — SIGNIFICANT CHANGE UP (ref 1.8–7.4)
NEUTROPHILS NFR BLD AUTO: 67.3 % — SIGNIFICANT CHANGE UP (ref 43–77)
PHOSPHATE SERPL-MCNC: 2.3 MG/DL — LOW (ref 2.5–4.5)
PLATELET # BLD AUTO: 231 K/UL — SIGNIFICANT CHANGE UP (ref 150–400)
POTASSIUM SERPL-MCNC: 4 MMOL/L — SIGNIFICANT CHANGE UP (ref 3.5–5.3)
POTASSIUM SERPL-SCNC: 4 MMOL/L — SIGNIFICANT CHANGE UP (ref 3.5–5.3)
RBC # BLD: 4.06 M/UL — SIGNIFICANT CHANGE UP (ref 3.8–5.2)
RBC # FLD: 12.4 % — SIGNIFICANT CHANGE UP (ref 10.3–14.5)
SODIUM SERPL-SCNC: 139 MMOL/L — SIGNIFICANT CHANGE UP (ref 135–145)
SPECIMEN SOURCE: SIGNIFICANT CHANGE UP
VIT B12 SERPL-MCNC: 506 PG/ML — SIGNIFICANT CHANGE UP (ref 232–1245)
WBC # BLD: 9.9 K/UL — SIGNIFICANT CHANGE UP (ref 3.8–10.5)
WBC # FLD AUTO: 9.9 K/UL — SIGNIFICANT CHANGE UP (ref 3.8–10.5)

## 2018-07-01 RX ORDER — ACETAMINOPHEN 500 MG
650 TABLET ORAL EVERY 6 HOURS
Qty: 0 | Refills: 0 | Status: DISCONTINUED | OUTPATIENT
Start: 2018-07-01 | End: 2018-07-04

## 2018-07-01 RX ADMIN — LISINOPRIL 5 MILLIGRAM(S): 2.5 TABLET ORAL at 11:47

## 2018-07-01 RX ADMIN — Medication 1: at 21:08

## 2018-07-01 RX ADMIN — Medication 650 MILLIGRAM(S): at 01:08

## 2018-07-01 RX ADMIN — INSULIN GLARGINE 10 UNIT(S): 100 INJECTION, SOLUTION SUBCUTANEOUS at 21:08

## 2018-07-01 RX ADMIN — Medication 1: at 11:47

## 2018-07-01 RX ADMIN — CEFTRIAXONE 100 GRAM(S): 500 INJECTION, POWDER, FOR SOLUTION INTRAMUSCULAR; INTRAVENOUS at 06:59

## 2018-07-01 RX ADMIN — Medication 2: at 08:21

## 2018-07-01 RX ADMIN — SIMVASTATIN 10 MILLIGRAM(S): 20 TABLET, FILM COATED ORAL at 21:08

## 2018-07-01 NOTE — PROGRESS NOTE ADULT - SUBJECTIVE AND OBJECTIVE BOX
HPI:  51 yo F w/ PMH  DM, UTI, left  Kidney Stones s/p lithotripsy , Adult PCKD,  p/w left flank pain, dysuria x 1 week. Pt was here on 18 for similar complaints and was discharge on PO abx. PT does not remember which antibiotic but states that completed about 7 days.  Symptoms improve but then again started again. Pt went to [facility] but due to long wait left the hospital. Today she comes again with dysuria, suprapubic pan and left flank pain.  At Ed pt was found to have positive UA. Abd CT shows concerns of left sided pyelonephritis. Pt denies CP, SOB, N/V, diarrhea or any other complaints.    LAst LMP 3 years ago (2018 09:55)      Patient is a 52y old  Female who presents with a chief complaint of dysuria and left flank pain (2018 09:55)      INTERVAL HPI/OVERNIGHT EVENTS:  T(C): 37.1 (18 @ 15:58), Max: 38.1 (18 @ 00:21)  HR: 89 (18 @ 15:58) (77 - 93)  BP: 132/63 (18 @ 15:58) (110/38 - 132/63)  RR: 18 (18 @ 15:58) (16 - 18)  SpO2: 95% (18 @ 15:58) (95% - 98%)  Wt(kg): --  I&O's Summary      REVIEW OF SYSTEMS: denies fever, chills, SOB, palpitations, chest pain, abdominal pain, nausea, vomitting, diarrhea, constipation, dizziness    MEDICATIONS  (STANDING):  cefTRIAXone   IVPB 1 Gram(s) IV Intermittent every 24 hours  dextrose 5%. 1000 milliLiter(s) (50 mL/Hr) IV Continuous <Continuous>  dextrose 50% Injectable 12.5 Gram(s) IV Push once  dextrose 50% Injectable 25 Gram(s) IV Push once  dextrose 50% Injectable 25 Gram(s) IV Push once  insulin glargine Injectable (LANTUS) 10 Unit(s) SubCutaneous at bedtime  insulin lispro (HumaLOG) corrective regimen sliding scale   SubCutaneous Before meals and at bedtime  lisinopril 5 milliGRAM(s) Oral daily  simvastatin 10 milliGRAM(s) Oral at bedtime  sodium chloride 0.9%. 1000 milliLiter(s) (125 mL/Hr) IV Continuous <Continuous>    MEDICATIONS  (PRN):  acetaminophen   Tablet 650 milliGRAM(s) Oral every 6 hours PRN For Temp greater than 38 C (100.4 F)  dextrose 40% Gel 15 Gram(s) Oral once PRN Blood Glucose LESS THAN 70 milliGRAM(s)/deciLiter  glucagon  Injectable 1 milliGRAM(s) IntraMuscular once PRN Glucose <70 milliGRAM(s)/deciLiter      PHYSICAL EXAM:  GENERAL: NAD, well-groomed, well-developed  HEAD:  Atraumatic, Normocephalic  EYES: EOMI, PERRLA, conjunctiva and sclera clear  ENMT: No tonsillar erythema, exudates, or enlargement; Moist mucous membranes, Good dentition, No lesions  NECK: Supple, No JVD, Normal thyroid  NERVOUS SYSTEM:  Alert & Oriented X3, Good concentration; Motor Strength 5/5 B/L upper and lower extremities; DTRs 2+ intact and symmetric  CHEST/LUNG: Clear to percussion bilaterally; No rales, rhonchi, wheezing, or rubs  HEART: Regular rate and rhythm; No murmurs, rubs, or gallops  ABDOMEN: Soft, Nontender, Nondistended; Bowel sounds present  EXTREMITIES:  2+ Peripheral Pulses, No clubbing, cyanosis, or edema  LYMPH: No lymphadenopathy noted  SKIN: No rashes or lesions  LABS:                        12.4   9.9   )-----------( 231      ( 2018 07:18 )             35.9     07-    139  |  108  |  19<H>  ----------------------------<  215<H>  4.0   |  24  |  1.69<H>    Ca    8.4      2018 07:18  Phos  2.3     07-  Mg     2.0     07-        Urinalysis Basic - ( 2018 04:02 )    Color: Yellow / Appearance: Slightly Turbid / S.010 / pH: x  Gluc: x / Ketone: Trace  / Bili: Negative / Urobili: 1   Blood: x / Protein: 100 / Nitrite: Positive   Leuk Esterase: Moderate / RBC: 10-25 /HPF / WBC 11-25 /HPF   Sq Epi: x / Non Sq Epi: Few /HPF / Bacteria: Many /HPF      CAPILLARY BLOOD GLUCOSE      POCT Blood Glucose.: 147 mg/dL (2018 16:27)  POCT Blood Glucose.: 198 mg/dL (2018 11:42)  POCT Blood Glucose.: 222 mg/dL (2018 08:07)  POCT Blood Glucose.: 222 mg/dL (2018 21:28)        Urinalysis Basic - ( 2018 04:02 )    Color: Yellow / Appearance: Slightly Turbid / S.010 / pH: x  Gluc: x / Ketone: Trace  / Bili: Negative / Urobili: 1   Blood: x / Protein: 100 / Nitrite: Positive   Leuk Esterase: Moderate / RBC: 10-25 /HPF / WBC 11-25 /HPF   Sq Epi: x / Non Sq Epi: Few /HPF / Bacteria: Many /HPF

## 2018-07-01 NOTE — PROGRESS NOTE ADULT - ASSESSMENT
johnathan nd eaxmined vs stable afebrile  physical unchanged   pain lt renal angle better  feeling much better  afebrile  no chills, no nausea or vomiting  labs noted   Pylonephritis   blood cxs negative  cont iv rocephin urine c still pending  a1c 9.1 wt reduction  by diet and exercise.  ENDOCRINOLOGY

## 2018-07-01 NOTE — PROGRESS NOTE ADULT - SUBJECTIVE AND OBJECTIVE BOX
PGY 1 Note discussed with supervising resident and primary attending    Patient is a 52y old  Female who presents with a chief complaint of dysuria and left flank pain (2018 09:55)      INTERVAL HPI/OVERNIGHT EVENTS: She was febrile last night.    MEDICATIONS  (STANDING):  cefTRIAXone   IVPB 1 Gram(s) IV Intermittent every 24 hours  dextrose 5%. 1000 milliLiter(s) (50 mL/Hr) IV Continuous <Continuous>  dextrose 50% Injectable 12.5 Gram(s) IV Push once  dextrose 50% Injectable 25 Gram(s) IV Push once  dextrose 50% Injectable 25 Gram(s) IV Push once  insulin glargine Injectable (LANTUS) 10 Unit(s) SubCutaneous at bedtime  insulin lispro (HumaLOG) corrective regimen sliding scale   SubCutaneous Before meals and at bedtime  lisinopril 5 milliGRAM(s) Oral daily  simvastatin 10 milliGRAM(s) Oral at bedtime  sodium chloride 0.9%. 1000 milliLiter(s) (125 mL/Hr) IV Continuous <Continuous>    MEDICATIONS  (PRN):  acetaminophen   Tablet 650 milliGRAM(s) Oral every 6 hours PRN For Temp greater than 38 C (100.4 F)  dextrose 40% Gel 15 Gram(s) Oral once PRN Blood Glucose LESS THAN 70 milliGRAM(s)/deciLiter  glucagon  Injectable 1 milliGRAM(s) IntraMuscular once PRN Glucose <70 milliGRAM(s)/deciLiter      __________________________________________________  REVIEW OF SYSTEMS:    CONSTITUTIONAL: No fever,   EYES: no acute visual disturbances  NECK: No pain or stiffness  RESPIRATORY: No cough; No shortness of breath  CARDIOVASCULAR: No chest pain, no palpitations  GASTROINTESTINAL: No pain. No nausea or vomiting; No diarrhea   NEUROLOGICAL: No headache or numbness, no tremors  MUSCULOSKELETAL: No joint pain, no muscle pain  GENITOURINARY: no dysuria, no frequency, no hesitancy  PSYCHIATRY: no depression , no anxiety  ALL OTHER  ROS negative        Vital Signs Last 24 Hrs  T(C): 37.1 (2018 15:58), Max: 38.1 (2018 00:21)  T(F): 98.8 (2018 15:58), Max: 100.6 (2018 00:21)  HR: 89 (2018 15:58) (77 - 93)  BP: 132/63 (2018 15:58) (110/38 - 132/63)  BP(mean): --  RR: 18 (2018 15:58) (16 - 18)  SpO2: 95% (2018 15:58) (95% - 98%)    ________________________________________________  PHYSICAL EXAM:  GENERAL: NAD  HEENT: Normocephalic;  conjunctivae and sclerae clear; moist mucous membranes;   NECK : supple  CHEST/LUNG: Clear to auscultation bilaterally with good air entry   HEART: S1 S2  regular; no murmurs, gallops or rubs  ABDOMEN: Soft, Nontender, Nondistended; Bowel sounds present  EXTREMITIES: no cyanosis; no edema; no calf tenderness  SKIN: warm and dry; no rash  NERVOUS SYSTEM:  Awake and alert; Oriented  to place, person and time ; no new deficits    _________________________________________________  LABS:                        12.4   9.9   )-----------( 231      ( 2018 07:18 )             35.9     07-01    139  |  108  |  19<H>  ----------------------------<  215<H>  4.0   |  24  |  1.69<H>    Ca    8.4      2018 07:18  Phos  2.3     07-01  Mg     2.0     07-01        Urinalysis Basic - ( 2018 04:02 )    Color: Yellow / Appearance: Slightly Turbid / S.010 / pH: x  Gluc: x / Ketone: Trace  / Bili: Negative / Urobili: 1   Blood: x / Protein: 100 / Nitrite: Positive   Leuk Esterase: Moderate / RBC: 10-25 /HPF / WBC 11-25 /HPF   Sq Epi: x / Non Sq Epi: Few /HPF / Bacteria: Many /HPF      CAPILLARY BLOOD GLUCOSE      POCT Blood Glucose.: 147 mg/dL (2018 16:27)  POCT Blood Glucose.: 198 mg/dL (2018 11:42)  POCT Blood Glucose.: 222 mg/dL (2018 08:07)  POCT Blood Glucose.: 222 mg/dL (2018 21:28)        RADIOLOGY & ADDITIONAL TESTS:    Imaging Personally Reviewed:  YES    Consultant(s) Notes Reviewed:   YES    Care Discussed with Consultants : YES     Plan of care was discussed with patient and /or primary care giver; all questions and concerns were addressed and care was aligned with patient's wishes.

## 2018-07-02 DIAGNOSIS — R31.0 GROSS HEMATURIA: ICD-10-CM

## 2018-07-02 LAB
ANION GAP SERPL CALC-SCNC: 13 MMOL/L — SIGNIFICANT CHANGE UP (ref 5–17)
BUN SERPL-MCNC: 22 MG/DL — HIGH (ref 7–18)
CALCIUM SERPL-MCNC: 9 MG/DL — SIGNIFICANT CHANGE UP (ref 8.4–10.5)
CHLORIDE SERPL-SCNC: 106 MMOL/L — SIGNIFICANT CHANGE UP (ref 96–108)
CO2 SERPL-SCNC: 20 MMOL/L — LOW (ref 22–31)
CREAT SERPL-MCNC: 1.78 MG/DL — HIGH (ref 0.5–1.3)
CULTURE RESULTS: NO GROWTH — SIGNIFICANT CHANGE UP
GLUCOSE BLDC GLUCOMTR-MCNC: 133 MG/DL — HIGH (ref 70–99)
GLUCOSE BLDC GLUCOMTR-MCNC: 139 MG/DL — HIGH (ref 70–99)
GLUCOSE BLDC GLUCOMTR-MCNC: 171 MG/DL — HIGH (ref 70–99)
GLUCOSE BLDC GLUCOMTR-MCNC: 193 MG/DL — HIGH (ref 70–99)
GLUCOSE BLDC GLUCOMTR-MCNC: 277 MG/DL — HIGH (ref 70–99)
GLUCOSE SERPL-MCNC: 147 MG/DL — HIGH (ref 70–99)
HCT VFR BLD CALC: 37.7 % — SIGNIFICANT CHANGE UP (ref 34.5–45)
HGB BLD-MCNC: 12.4 G/DL — SIGNIFICANT CHANGE UP (ref 11.5–15.5)
MAGNESIUM SERPL-MCNC: 2.1 MG/DL — SIGNIFICANT CHANGE UP (ref 1.6–2.6)
MCHC RBC-ENTMCNC: 28.7 PG — SIGNIFICANT CHANGE UP (ref 27–34)
MCHC RBC-ENTMCNC: 32.9 GM/DL — SIGNIFICANT CHANGE UP (ref 32–36)
MCV RBC AUTO: 87.3 FL — SIGNIFICANT CHANGE UP (ref 80–100)
PHOSPHATE SERPL-MCNC: 3.7 MG/DL — SIGNIFICANT CHANGE UP (ref 2.5–4.5)
PLATELET # BLD AUTO: 237 K/UL — SIGNIFICANT CHANGE UP (ref 150–400)
POTASSIUM SERPL-MCNC: 3.2 MMOL/L — LOW (ref 3.5–5.3)
POTASSIUM SERPL-SCNC: 3.2 MMOL/L — LOW (ref 3.5–5.3)
RBC # BLD: 4.32 M/UL — SIGNIFICANT CHANGE UP (ref 3.8–5.2)
RBC # FLD: 12 % — SIGNIFICANT CHANGE UP (ref 10.3–14.5)
SODIUM SERPL-SCNC: 139 MMOL/L — SIGNIFICANT CHANGE UP (ref 135–145)
SPECIMEN SOURCE: SIGNIFICANT CHANGE UP
WBC # BLD: 7.6 K/UL — SIGNIFICANT CHANGE UP (ref 3.8–10.5)
WBC # FLD AUTO: 7.6 K/UL — SIGNIFICANT CHANGE UP (ref 3.8–10.5)

## 2018-07-02 RX ORDER — DIPHENHYDRAMINE HCL 50 MG
25 CAPSULE ORAL ONCE
Qty: 0 | Refills: 0 | Status: COMPLETED | OUTPATIENT
Start: 2018-07-02 | End: 2018-07-02

## 2018-07-02 RX ORDER — POTASSIUM CHLORIDE 20 MEQ
40 PACKET (EA) ORAL ONCE
Qty: 0 | Refills: 0 | Status: COMPLETED | OUTPATIENT
Start: 2018-07-02 | End: 2018-07-02

## 2018-07-02 RX ADMIN — CEFTRIAXONE 100 GRAM(S): 500 INJECTION, POWDER, FOR SOLUTION INTRAMUSCULAR; INTRAVENOUS at 05:22

## 2018-07-02 RX ADMIN — Medication 1: at 21:16

## 2018-07-02 RX ADMIN — SIMVASTATIN 10 MILLIGRAM(S): 20 TABLET, FILM COATED ORAL at 21:16

## 2018-07-02 RX ADMIN — INSULIN GLARGINE 10 UNIT(S): 100 INJECTION, SOLUTION SUBCUTANEOUS at 21:16

## 2018-07-02 RX ADMIN — Medication 3: at 11:49

## 2018-07-02 RX ADMIN — Medication 650 MILLIGRAM(S): at 01:24

## 2018-07-02 RX ADMIN — Medication 25 MILLIGRAM(S): at 05:21

## 2018-07-02 RX ADMIN — Medication 40 MILLIEQUIVALENT(S): at 11:49

## 2018-07-02 RX ADMIN — LISINOPRIL 5 MILLIGRAM(S): 2.5 TABLET ORAL at 05:21

## 2018-07-02 NOTE — PROGRESS NOTE ADULT - PROBLEM SELECTOR PLAN 1
Hematuria x 2episodes   Repeat CT scan when the kidney function is normal.  F/u with urine analysis  Monitor  BMP Hematuria x 2episodes   SCR is 1.7 trending up due to lisinopril.  Repeat BMP and continue with antibiotics as per Dr Lorenz.  Type and cross matching  f/u urology consult  F/u with urine analysis  Monitor  BMP

## 2018-07-02 NOTE — PROGRESS NOTE ADULT - ASSESSMENT
johnathan nd examined  as per her she ahd large hematurea  once  now better, no back pain   afebrile  physical unchanged  no abd pain  lt renal angle tenderness better.  watch cbc and t and cross  urology,  nephro already on board  urine cxs.  nephro already on case

## 2018-07-02 NOTE — PROGRESS NOTE ADULT - PROBLEM SELECTOR PLAN 2
presents with dysuria, suprapubic pain  Abd CT: shows PCKD and Left pyelonephritis  Rocephin   f/u Ucx presents with dysuria, suprapubic pain  Abd CT: shows PCKD and Left pyelonephritis  Rocephin   f/u Ucx  monitor wbc

## 2018-07-02 NOTE — PROGRESS NOTE ADULT - ASSESSMENT
52 yr old female with JOSIAH on CKD sec to APCKD.  Admitted with acute pyelonephritis    rec's  renal function relatively stable although SCR slightly increased with start of Lisinopril  cont IV ABX for acute pyelo  rpt BMP in  AM

## 2018-07-02 NOTE — DIETITIAN INITIAL EVALUATION ADULT. - OTHER INFO
Pt visited. Pt is Romanian speaking. With help of [employer]  Phone ID # [phone] information obtained. per pt  H/O Dm x ~ 2 years. On oral hypoglycemic medicine. Pt is non compliance  to the medicine. Per pt sometimes she gets up late and  so does not take her Meds. and also ot does not check her Blood  Glucose every day. NKFa reported. Observed Lunch  meal pt ate 100 % of meal.

## 2018-07-02 NOTE — PROGRESS NOTE ADULT - SUBJECTIVE AND OBJECTIVE BOX
HPI:  51 yo F w/ PMH  DM, UTI, left  Kidney Stones s/p lithotripsy , Adult PCKD,  p/w left flank pain, dysuria x 1 week. Pt was here on 7/7/18 for similar complaints and was discharge on PO abx. PT does not remember which antibiotic but states that completed about 7 days.  Symptoms improve but then again started again. Pt went to [facility] but due to long wait left the hospital. Today she comes again with dysuria, suprapubic pan and left flank pain.  At Ed pt was found to have positive UA. Abd CT shows concerns of left sided pyelonephritis. Pt denies CP, SOB, N/V, diarrhea or any other complaints.    LAst LMP 3 years ago (30 Jun 2018 09:55)      Patient is a 52y old  Female who presents with a chief complaint of dysuria and left flank pain (30 Jun 2018 09:55)      INTERVAL HPI/OVERNIGHT EVENTS:  T(C): 36.3 (07-02-18 @ 07:34), Max: 37.1 (07-01-18 @ 15:58)  HR: 65 (07-02-18 @ 07:34) (65 - 89)  BP: 101/64 (07-02-18 @ 07:34) (101/64 - 133/59)  RR: 16 (07-02-18 @ 07:34) (16 - 18)  SpO2: 100% (07-02-18 @ 07:34) (95% - 100%)  Wt(kg): --  I&O's Summary      REVIEW OF SYSTEMS: denies fever, chills, SOB, palpitations, chest pain, abdominal pain, nausea, vomitting, diarrhea, constipation, dizziness    MEDICATIONS  (STANDING):  cefTRIAXone   IVPB 1 Gram(s) IV Intermittent every 24 hours  dextrose 5%. 1000 milliLiter(s) (50 mL/Hr) IV Continuous <Continuous>  dextrose 50% Injectable 12.5 Gram(s) IV Push once  dextrose 50% Injectable 25 Gram(s) IV Push once  dextrose 50% Injectable 25 Gram(s) IV Push once  insulin glargine Injectable (LANTUS) 10 Unit(s) SubCutaneous at bedtime  insulin lispro (HumaLOG) corrective regimen sliding scale   SubCutaneous Before meals and at bedtime  lisinopril 5 milliGRAM(s) Oral daily  simvastatin 10 milliGRAM(s) Oral at bedtime  sodium chloride 0.9%. 1000 milliLiter(s) (125 mL/Hr) IV Continuous <Continuous>    MEDICATIONS  (PRN):  acetaminophen   Tablet 650 milliGRAM(s) Oral every 6 hours PRN For Temp greater than 38 C (100.4 F)  dextrose 40% Gel 15 Gram(s) Oral once PRN Blood Glucose LESS THAN 70 milliGRAM(s)/deciLiter  glucagon  Injectable 1 milliGRAM(s) IntraMuscular once PRN Glucose <70 milliGRAM(s)/deciLiter      PHYSICAL EXAM:  GENERAL: NAD, well-groomed, well-developed  HEAD:  Atraumatic, Normocephalic  EYES: EOMI, PERRLA, conjunctiva and sclera clear  ENMT: No tonsillar erythema, exudates, or enlargement; Moist mucous membranes, Good dentition, No lesions  NECK: Supple, No JVD, Normal thyroid  NERVOUS SYSTEM:  Alert & Oriented X3, Good concentration; Motor Strength 5/5 B/L upper and lower extremities; DTRs 2+ intact and symmetric  CHEST/LUNG: Clear to percussion bilaterally; No rales, rhonchi, wheezing, or rubs  HEART: Regular rate and rhythm; No murmurs, rubs, or gallops  ABDOMEN: Soft, Nontender, Nondistended; Bowel sounds present  EXTREMITIES:  2+ Peripheral Pulses, No clubbing, cyanosis, or edema  LYMPH: No lymphadenopathy noted  SKIN: No rashes or lesions  LABS:                        12.4   7.6   )-----------( 237      ( 02 Jul 2018 06:55 )             37.7     07-02    139  |  106  |  22<H>  ----------------------------<  147<H>  3.2<L>   |  20<L>  |  1.78<H>    Ca    9.0      02 Jul 2018 06:55  Phos  3.7     07-02  Mg     2.1     07-02          CAPILLARY BLOOD GLUCOSE      POCT Blood Glucose.: 277 mg/dL (02 Jul 2018 11:19)  POCT Blood Glucose.: 133 mg/dL (02 Jul 2018 08:36)  POCT Blood Glucose.: 191 mg/dL (01 Jul 2018 21:02)  POCT Blood Glucose.: 147 mg/dL (01 Jul 2018 16:27)

## 2018-07-02 NOTE — PROGRESS NOTE ADULT - SUBJECTIVE AND OBJECTIVE BOX
PGY 1 Note discussed with supervising resident and primary attending    Patient is a 52y old  Female who presents with a chief complaint of dysuria and left flank pain (30 Jun 2018 09:55)      INTERVAL HPI/OVERNIGHT EVENTS: Hematuria and patient complained of severe Left  flank pain.    MEDICATIONS  (STANDING):  cefTRIAXone   IVPB 1 Gram(s) IV Intermittent every 24 hours  dextrose 5%. 1000 milliLiter(s) (50 mL/Hr) IV Continuous <Continuous>  dextrose 50% Injectable 12.5 Gram(s) IV Push once  dextrose 50% Injectable 25 Gram(s) IV Push once  dextrose 50% Injectable 25 Gram(s) IV Push once  insulin glargine Injectable (LANTUS) 10 Unit(s) SubCutaneous at bedtime  insulin lispro (HumaLOG) corrective regimen sliding scale   SubCutaneous Before meals and at bedtime  lisinopril 5 milliGRAM(s) Oral daily  simvastatin 10 milliGRAM(s) Oral at bedtime  sodium chloride 0.9%. 1000 milliLiter(s) (125 mL/Hr) IV Continuous <Continuous>    MEDICATIONS  (PRN):  acetaminophen   Tablet 650 milliGRAM(s) Oral every 6 hours PRN For Temp greater than 38 C (100.4 F)  dextrose 40% Gel 15 Gram(s) Oral once PRN Blood Glucose LESS THAN 70 milliGRAM(s)/deciLiter  glucagon  Injectable 1 milliGRAM(s) IntraMuscular once PRN Glucose <70 milliGRAM(s)/deciLiter      __________________________________________________  REVIEW OF SYSTEMS:    CONSTITUTIONAL: No fever,   EYES: no acute visual disturbances  NECK: No pain or stiffness  RESPIRATORY: No cough; No shortness of breath  CARDIOVASCULAR: No chest pain, no palpitations  GASTROINTESTINAL: No pain. No nausea or vomiting; No diarrhea   NEUROLOGICAL: No headache or numbness, no tremors  MUSCULOSKELETAL: No joint pain, no muscle pain  GENITOURINARY: no dysuria, no frequency, no hesitancy  PSYCHIATRY: no depression , no anxiety  ALL OTHER  ROS negative        Vital Signs Last 24 Hrs  T(C): 36.6 (02 Jul 2018 15:40), Max: 37.1 (02 Jul 2018 00:05)  T(F): 97.9 (02 Jul 2018 15:40), Max: 98.7 (02 Jul 2018 00:05)  HR: 77 (02 Jul 2018 15:40) (65 - 82)  BP: 107/56 (02 Jul 2018 15:40) (101/64 - 133/59)  BP(mean): --  RR: 16 (02 Jul 2018 15:40) (16 - 18)  SpO2: 97% (02 Jul 2018 15:40) (97% - 100%)    ________________________________________________  PHYSICAL EXAM:  GENERAL: NAD  HEENT: Normocephalic;  conjunctivae and sclerae clear; moist mucous membranes;   NECK : supple  CHEST/LUNG: Clear to auscultation bilaterally with good air entry   HEART: S1 S2  regular; no murmurs, gallops or rubs  ABDOMEN: Soft, Nontender, Nondistended; Bowel sounds present  EXTREMITIES: no cyanosis; no edema; no calf tenderness  SKIN: warm and dry; no rash  NERVOUS SYSTEM:  Awake and alert; Oriented  to place, person and time ; no new deficits    _________________________________________________  LABS:                        12.4   7.6   )-----------( 237      ( 02 Jul 2018 06:55 )             37.7     07-02    139  |  106  |  22<H>  ----------------------------<  147<H>  3.2<L>   |  20<L>  |  1.78<H>    Ca    9.0      02 Jul 2018 06:55  Phos  3.7     07-02  Mg     2.1     07-02          CAPILLARY BLOOD GLUCOSE      POCT Blood Glucose.: 277 mg/dL (02 Jul 2018 11:19)  POCT Blood Glucose.: 133 mg/dL (02 Jul 2018 08:36)  POCT Blood Glucose.: 191 mg/dL (01 Jul 2018 21:02)  POCT Blood Glucose.: 147 mg/dL (01 Jul 2018 16:27)        RADIOLOGY & ADDITIONAL TESTS:    Imaging Personally Reviewed:  YES    Consultant(s) Notes Reviewed:   YES    Care Discussed with Consultants : YES     Plan of care was discussed with patient and /or primary care giver; all questions and concerns were addressed and care was aligned with patient's wishes.

## 2018-07-02 NOTE — PROGRESS NOTE ADULT - SUBJECTIVE AND OBJECTIVE BOX
lt flank pain better  had an episode of hematuria last night.   No Known Allergies    Hospital Medications:   MEDICATIONS  (STANDING):  cefTRIAXone   IVPB 1 Gram(s) IV Intermittent every 24 hours  dextrose 5%. 1000 milliLiter(s) (50 mL/Hr) IV Continuous <Continuous>  dextrose 50% Injectable 12.5 Gram(s) IV Push once  dextrose 50% Injectable 25 Gram(s) IV Push once  dextrose 50% Injectable 25 Gram(s) IV Push once  insulin glargine Injectable (LANTUS) 10 Unit(s) SubCutaneous at bedtime  insulin lispro (HumaLOG) corrective regimen sliding scale   SubCutaneous Before meals and at bedtime  lisinopril 5 milliGRAM(s) Oral daily  simvastatin 10 milliGRAM(s) Oral at bedtime  sodium chloride 0.9%. 1000 milliLiter(s) (125 mL/Hr) IV Continuous <Continuous>        VITALS:  T(F): 97.9 (18 @ 15:40), Max: 98.7 (18 @ 00:05)  HR: 77 (18 @ 15:40)  BP: 107/56 (18 @ 15:40)  RR: 16 (18 @ 15:40)  SpO2: 97% (18 @ 15:40)  Wt(kg): --    Height (cm): 160.02 ( @ 08:13)  PHYSICAL EXAM:  Constitutional: NAD  HEENT: anicteric sclera, oropharynx clear, MMM  Neck: No JVD  Respiratory: CTAB, no wheezes, rales or rhonchi  Cardiovascular: S1, S2, RRR  Gastrointestinal: BS+, soft, NT/ND  Extremities: No cyanosis or clubbing. No peripheral edema  Neurological: A/O x 3, no focal deficits  Psychiatric: Normal mood, normal affect  : No CVA tenderness. No larson.   Skin: No rashes      LABS:      139  |  106  |  22<H>  ----------------------------<  147<H>  3.2<L>   |  20<L>  |  1.78<H>    Ca    9.0      2018 06:55  Phos  3.7       Mg     2.1           Creatinine Trend: 1.78 <--, 1.69 <--, 1.67 <--                        12.4   7.6   )-----------( 237      ( 2018 06:55 )             37.7     Urine Studies:  Urinalysis Basic - ( 2018 04:02 )    Color: Yellow / Appearance: Slightly Turbid / S.010 / pH:   Gluc:  / Ketone: Trace  / Bili: Negative / Urobili: 1   Blood:  / Protein: 100 / Nitrite: Positive   Leuk Esterase: Moderate / RBC: 10-25 /HPF / WBC 11-25 /HPF   Sq Epi:  / Non Sq Epi: Few /HPF / Bacteria: Many /HPF        RADIOLOGY & ADDITIONAL STUDIES:

## 2018-07-03 LAB
ANION GAP SERPL CALC-SCNC: 12 MMOL/L — SIGNIFICANT CHANGE UP (ref 5–17)
BUN SERPL-MCNC: 32 MG/DL — HIGH (ref 7–18)
CALCIUM SERPL-MCNC: 8.8 MG/DL — SIGNIFICANT CHANGE UP (ref 8.4–10.5)
CHLORIDE SERPL-SCNC: 108 MMOL/L — SIGNIFICANT CHANGE UP (ref 96–108)
CO2 SERPL-SCNC: 20 MMOL/L — LOW (ref 22–31)
CREAT SERPL-MCNC: 1.76 MG/DL — HIGH (ref 0.5–1.3)
CULTURE RESULTS: NO GROWTH — SIGNIFICANT CHANGE UP
GLUCOSE BLDC GLUCOMTR-MCNC: 158 MG/DL — HIGH (ref 70–99)
GLUCOSE BLDC GLUCOMTR-MCNC: 166 MG/DL — HIGH (ref 70–99)
GLUCOSE BLDC GLUCOMTR-MCNC: 207 MG/DL — HIGH (ref 70–99)
GLUCOSE BLDC GLUCOMTR-MCNC: 254 MG/DL — HIGH (ref 70–99)
GLUCOSE SERPL-MCNC: 162 MG/DL — HIGH (ref 70–99)
HCT VFR BLD CALC: 39.8 % — SIGNIFICANT CHANGE UP (ref 34.5–45)
HGB BLD-MCNC: 12.7 G/DL — SIGNIFICANT CHANGE UP (ref 11.5–15.5)
MAGNESIUM SERPL-MCNC: 2.1 MG/DL — SIGNIFICANT CHANGE UP (ref 1.6–2.6)
MCHC RBC-ENTMCNC: 28.2 PG — SIGNIFICANT CHANGE UP (ref 27–34)
MCHC RBC-ENTMCNC: 32 GM/DL — SIGNIFICANT CHANGE UP (ref 32–36)
MCV RBC AUTO: 88 FL — SIGNIFICANT CHANGE UP (ref 80–100)
PHOSPHATE SERPL-MCNC: 3.7 MG/DL — SIGNIFICANT CHANGE UP (ref 2.5–4.5)
PLATELET # BLD AUTO: 312 K/UL — SIGNIFICANT CHANGE UP (ref 150–400)
POTASSIUM SERPL-MCNC: 3.7 MMOL/L — SIGNIFICANT CHANGE UP (ref 3.5–5.3)
POTASSIUM SERPL-SCNC: 3.7 MMOL/L — SIGNIFICANT CHANGE UP (ref 3.5–5.3)
RBC # BLD: 4.52 M/UL — SIGNIFICANT CHANGE UP (ref 3.8–5.2)
RBC # FLD: 12.2 % — SIGNIFICANT CHANGE UP (ref 10.3–14.5)
SODIUM SERPL-SCNC: 140 MMOL/L — SIGNIFICANT CHANGE UP (ref 135–145)
SPECIMEN SOURCE: SIGNIFICANT CHANGE UP
WBC # BLD: 7 K/UL — SIGNIFICANT CHANGE UP (ref 3.8–10.5)
WBC # FLD AUTO: 7 K/UL — SIGNIFICANT CHANGE UP (ref 3.8–10.5)

## 2018-07-03 RX ADMIN — Medication 1: at 17:21

## 2018-07-03 RX ADMIN — CEFTRIAXONE 100 GRAM(S): 500 INJECTION, POWDER, FOR SOLUTION INTRAMUSCULAR; INTRAVENOUS at 05:06

## 2018-07-03 RX ADMIN — Medication 1: at 08:34

## 2018-07-03 RX ADMIN — INSULIN GLARGINE 10 UNIT(S): 100 INJECTION, SOLUTION SUBCUTANEOUS at 22:22

## 2018-07-03 RX ADMIN — SIMVASTATIN 10 MILLIGRAM(S): 20 TABLET, FILM COATED ORAL at 22:22

## 2018-07-03 RX ADMIN — Medication 2: at 22:22

## 2018-07-03 RX ADMIN — Medication 3: at 12:04

## 2018-07-03 NOTE — PROGRESS NOTE ADULT - SUBJECTIVE AND OBJECTIVE BOX
PGY 1 Note discussed with supervising resident and primary attending    Patient is a 52y old  Female who presents with a chief complaint of dysuria and left flank pain (30 Jun 2018 09:55)      INTERVAL HPI/OVERNIGHT EVENTS:  Flank pain got better and no hematuria.    MEDICATIONS  (STANDING):  cefTRIAXone   IVPB 1 Gram(s) IV Intermittent every 24 hours  dextrose 5%. 1000 milliLiter(s) (50 mL/Hr) IV Continuous <Continuous>  dextrose 50% Injectable 12.5 Gram(s) IV Push once  dextrose 50% Injectable 25 Gram(s) IV Push once  dextrose 50% Injectable 25 Gram(s) IV Push once  insulin glargine Injectable (LANTUS) 10 Unit(s) SubCutaneous at bedtime  insulin lispro (HumaLOG) corrective regimen sliding scale   SubCutaneous Before meals and at bedtime  lisinopril 5 milliGRAM(s) Oral daily  simvastatin 10 milliGRAM(s) Oral at bedtime  sodium chloride 0.9%. 1000 milliLiter(s) (125 mL/Hr) IV Continuous <Continuous>    MEDICATIONS  (PRN):  acetaminophen   Tablet 650 milliGRAM(s) Oral every 6 hours PRN For Temp greater than 38 C (100.4 F)  dextrose 40% Gel 15 Gram(s) Oral once PRN Blood Glucose LESS THAN 70 milliGRAM(s)/deciLiter  glucagon  Injectable 1 milliGRAM(s) IntraMuscular once PRN Glucose <70 milliGRAM(s)/deciLiter      __________________________________________________  REVIEW OF SYSTEMS:    CONSTITUTIONAL: No fever,   EYES: no acute visual disturbances  NECK: No pain or stiffness  RESPIRATORY: No cough; No shortness of breath  CARDIOVASCULAR: No chest pain, no palpitations  GASTROINTESTINAL: No pain. No nausea or vomiting; No diarrhea   NEUROLOGICAL: No headache or numbness, no tremors  MUSCULOSKELETAL: No joint pain, no muscle pain  GENITOURINARY: no dysuria, no frequency, no hesitancy, no hematuria  PSYCHIATRY: no depression , no anxiety  ALL OTHER  ROS negative        Vital Signs Last 24 Hrs  T(C): 36.5 (03 Jul 2018 15:47), Max: 36.9 (03 Jul 2018 07:45)  T(F): 97.7 (03 Jul 2018 15:47), Max: 98.4 (03 Jul 2018 07:45)  HR: 63 (03 Jul 2018 15:47) (63 - 75)  BP: 114/72 (03 Jul 2018 15:47) (102/66 - 114/72)  BP(mean): --  RR: 18 (03 Jul 2018 15:47) (16 - 18)  SpO2: 99% (03 Jul 2018 15:47) (96% - 100%)    ________________________________________________  PHYSICAL EXAM:  GENERAL: NAD  HEENT: Normocephalic;  conjunctivae and sclerae clear; moist mucous membranes;   NECK : supple  CHEST/LUNG: Clear to auscultation bilaterally with good air entry   HEART: S1 S2  regular; no murmurs, gallops or rubs  ABDOMEN: Soft, Nontender, Nondistended; Bowel sounds present, left  renal angle tenderness positive  EXTREMITIES: no cyanosis; no edema; no calf tenderness  SKIN: warm and dry; no rash  NERVOUS SYSTEM:  Awake and alert; Oriented  to place, person and time ; no new deficits    _________________________________________________  LABS:                        12.7   7.0   )-----------( 312      ( 03 Jul 2018 07:44 )             39.8     07-03    140  |  108  |  32<H>  ----------------------------<  162<H>  3.7   |  20<L>  |  1.76<H>    Ca    8.8      03 Jul 2018 07:44  Phos  3.7     07-03  Mg     2.1     07-03          CAPILLARY BLOOD GLUCOSE      POCT Blood Glucose.: 158 mg/dL (03 Jul 2018 16:53)  POCT Blood Glucose.: 254 mg/dL (03 Jul 2018 11:37)  POCT Blood Glucose.: 166 mg/dL (03 Jul 2018 08:25)  POCT Blood Glucose.: 193 mg/dL (02 Jul 2018 20:59)        RADIOLOGY & ADDITIONAL TESTS:    Imaging Personally Reviewed:  YES    Consultant(s) Notes Reviewed:   YES    Care Discussed with Consultants : YES     Plan of care was discussed with patient and /or primary care giver; all questions and concerns were addressed and care was aligned with patient's wishes.

## 2018-07-03 NOTE — PROGRESS NOTE ADULT - ASSESSMENT
51 yo F w/ PMH  D I, UTI, left  Kidney Stones s/p lithotripsy , Adult PCKD,  p/w left flank pain, dysuria x 1 week.  At ED pt was found to have positive UA. Abd CT shows concerns of left sided pyelonephritis.

## 2018-07-03 NOTE — CONSULT NOTE ADULT - ASSESSMENT
A/P    PT   WITH  ADPCKD  WITH   L   FLANK PAIN   AND  CHILLS     CT  SUGGESTIVE OF  L  PYELONEPHRITIS   HAS BILAT  RENAL CALCULI   AS WELL,  NOT OBSTRUCTED    UA ALSO POSITIVE   IS ON  IV   CEFTRIAXONE,    IS  SYMPTOMATICALLY  BETTER     CR  IS  AT  1.6,   WAS  BEING  FOLLOWED  IN  [place]  RENAL  CLINIC,  UNSURE OF  THE  BASELINE   WILL   TREND   IF   CR  REMAINS  STABLE   WILL   CONSIDER  ADDING  ACEI    WILL   FOLLOW
51 y/o Female with a PMHx of CKD, PCKD bilaterally, and nephrolithiasis with hematuria yesterday.    Plan:  1) f/u with urine culture  2) Continue antibiotics  3) Recommend CT with IV contrast to rule out any kidney lesions  4) Discussed with Dr. Hernandes & he agrees

## 2018-07-03 NOTE — CONSULT NOTE ADULT - SUBJECTIVE AND OBJECTIVE BOX
This is a 52yFemale with PMHx of       PMHx:   SurgHx:   Social Hx:  Allergies: No Known Allergies        Vitals: T(F): 97.7 (07-03-18 @ 15:47), Max: 98.4 (07-03-18 @ 07:45)  HR: 63 (07-03-18 @ 15:47) (63 - 75)  BP: 114/72 (07-03-18 @ 15:47) (102/66 - 114/72)  RR: 18 (07-03-18 @ 15:47) (16 - 18)  SpO2: 99% (07-03-18 @ 15:47) (96% - 100%)      Labs:                       12.7   7.0   )-----------( 312      ( 03 Jul 2018 07:44 )             39.8     07-03    140  |  108  |  32<H>  ----------------------------<  162<H>  3.7   |  20<L>  |  1.76<H>    Ca    8.8      03 Jul 2018 07:44  Phos  3.7     07-03  Mg     2.1     07-03      Urinalaysis:   Urinalysis (06.30.18 @ 04:02)    Glucose Qualitative, Urine: Negative    Blood, Urine: Large    pH Urine: 6.0    Color: Yellow    Urine Appearance: Slightly Turbid    Bilirubin: Negative    Ketone - Urine: Trace    Specific Gravity: 1.010    Protein, Urine: 100    Urobilinogen: 1    Nitrite: Positive    Leukocyte Esterase Concentration: Moderate      Urine Culture:   Culture - Urine (07.01.18 @ 22:15)    Specimen Source: .Urine Clean Catch (Midstream)    Culture Results: No growth      CT: < from: CT Abdomen and Pelvis No Cont (06.30.18 @ 03:01) >  Lower Thorax: No consolidation or effusion. Trace pericardial effusion or   thickening without significant cardiomegaly.    Liver: No suspicious lesions.      Biliary: No dilatation. No radiodense gallstones visualized within the   gallbladder.  Spleen: No suspicious lesions.      Pancreas: No inflammatory changes or ductal dilatation.      Adrenals: Normal.      Kidneys: Redemonstrated scattered bilateral renal hypo and hyperdensities   throughout enlarged bilateral kidneys, likely represent combination of   simple and complex cysts of autosomal dominant polycystic kidney disease.   Stable appearance of bilateral renal calculi without hydronephrosis. No    radiodense stone throughout the course of ureters. There is interval mild   left perinephric stranding. Clinical correlation is advised to exclude   left pyelonephritis in appropriate clinical setting.  Vessels: Normal caliber. Mild atherosclerotic calcifications of abdominal   aorta and branch vessels.    GI tract: No evidence of small bowel obstruction. No wall thickening or   inflammatory changes. Mild scattered colonic diverticulosis without   evidence of acute diverticulitis. Normal-appearing appendix.    Peritoneum/retroperitoneum and mesentery: No free air. No organized fluid   collection. No adenopathy.    Pelvic organs/Bladder: No pelvic masses. Bladder is decompressed.    Abdominal wall: A tiny fat containing umbilical hernia is noted.  Bones and soft tissues: Mild multilevel degenerative changes of the spine   noted. Stable sclerosis adjacent tothe SI joints bilaterally.      IMPRESSION:    Redemonstrated scattered bilateral renal hypo and hyperdensities   throughout enlarged bilateral kidneys, likely represent combination of   simple and complex cysts of autosomal dominant polycystic kidney disease.   Stable appearance of bilateral renal calculi without hydronephrosis or   obstructive uropathy. Interval mild left perinephric stranding. Correlate   with urinalysis to exclude left pyelonephritis in appropriate clinical   setting.        Physical Exam  General: AAO x 3, no acute distress  Abdomen:  Back:   : This is a 53 y/o Female with a PMHx of CKD, PCKD bilaterally, and nephrolithiasis was admitted on 6/30/18 for left sided pyelonephritis.  A urology consult was called due to hematuria yesterday.  Pt admits to hematuria 3 times yesterday, and 1 time the day before.  Her urine is clear today without blood.  She says there were some clots in her urine 2 days ago, but none yesterday and none today.  She admits to hematuria in the past, dysuria and denies suprapubic pain or flank pain.        PMHx: CKD stage 3, bilateral PCKD, and nephrolithiasis  SurgHx: lithotripsy and cystoscopy  Social Hx: None smoker  Allergies: No Known Allergies        Vitals: T(F): 97.7 (07-03-18 @ 15:47), Max: 98.4 (07-03-18 @ 07:45)  HR: 63 (07-03-18 @ 15:47) (63 - 75)  BP: 114/72 (07-03-18 @ 15:47) (102/66 - 114/72)  RR: 18 (07-03-18 @ 15:47) (16 - 18)  SpO2: 99% (07-03-18 @ 15:47) (96% - 100%)      Labs:                       12.7   7.0   )-----------( 312      ( 03 Jul 2018 07:44 )             39.8     07-03    140  |  108  |  32<H>  ----------------------------<  162<H>  3.7   |  20<L>  |  1.76<H>    Ca    8.8      03 Jul 2018 07:44  Phos  3.7     07-03  Mg     2.1     07-03      Urinalaysis:   Urinalysis (06.30.18 @ 04:02)    Glucose Qualitative, Urine: Negative    Blood, Urine: Large    pH Urine: 6.0    Color: Yellow    Urine Appearance: Slightly Turbid    Bilirubin: Negative    Ketone - Urine: Trace    Specific Gravity: 1.010    Protein, Urine: 100    Urobilinogen: 1    Nitrite: Positive    Leukocyte Esterase Concentration: Moderate      Urine Culture:   Culture - Urine (07.01.18 @ 22:15)    Specimen Source: .Urine Clean Catch (Midstream)    Culture Results: No growth      CT: < from: CT Abdomen and Pelvis No Cont (06.30.18 @ 03:01) >  Lower Thorax: No consolidation or effusion. Trace pericardial effusion or   thickening without significant cardiomegaly.    Liver: No suspicious lesions.      Biliary: No dilatation. No radiodense gallstones visualized within the   gallbladder.  Spleen: No suspicious lesions.      Pancreas: No inflammatory changes or ductal dilatation.      Adrenals: Normal.      Kidneys: Redemonstrated scattered bilateral renal hypo and hyperdensities   throughout enlarged bilateral kidneys, likely represent combination of   simple and complex cysts of autosomal dominant polycystic kidney disease.   Stable appearance of bilateral renal calculi without hydronephrosis. No    radiodense stone throughout the course of ureters. There is interval mild   left perinephric stranding. Clinical correlation is advised to exclude   left pyelonephritis in appropriate clinical setting.  Vessels: Normal caliber. Mild atherosclerotic calcifications of abdominal   aorta and branch vessels.    GI tract: No evidence of small bowel obstruction. No wall thickening or   inflammatory changes. Mild scattered colonic diverticulosis without   evidence of acute diverticulitis. Normal-appearing appendix.    Peritoneum/retroperitoneum and mesentery: No free air. No organized fluid   collection. No adenopathy.    Pelvic organs/Bladder: No pelvic masses. Bladder is decompressed.    Abdominal wall: A tiny fat containing umbilical hernia is noted.  Bones and soft tissues: Mild multilevel degenerative changes of the spine   noted. Stable sclerosis adjacent tothe SI joints bilaterally.      IMPRESSION:    Redemonstrated scattered bilateral renal hypo and hyperdensities   throughout enlarged bilateral kidneys, likely represent combination of   simple and complex cysts of autosomal dominant polycystic kidney disease.   Stable appearance of bilateral renal calculi without hydronephrosis or   obstructive uropathy. Interval mild left perinephric stranding. Correlate   with urinalysis to exclude left pyelonephritis in appropriate clinical   setting.        Physical Exam  General: AAO x 3, no acute distress  Abdomen: soft, non-distended, no tenderness to palpation  : no suprapubic pain, L side CVA tenderness

## 2018-07-03 NOTE — PROGRESS NOTE ADULT - PROBLEM SELECTOR PLAN 1
presents with dysuria, suprapubic pain likely due to pyelonephritis  Abd CT: shows PCKD and Left pyelonephritis   Continue with Rocephin   F/U  Nephrologist as out patient  f/u urologist  f/u Ucx  monitor wbc

## 2018-07-03 NOTE — PROGRESS NOTE ADULT - ASSESSMENT
seen and examined  vs stable  afebrile  lt flank pain better, no hematurea.    comfortable on bed  physical unchanged  lt renal angle tenderness much better  hematurea  awaiting for urologist,   cont rocephine    ckd  h/o DM/Obesity and cystic disease  needs to f/u nephrologist as outpt

## 2018-07-03 NOTE — PROGRESS NOTE ADULT - SUBJECTIVE AND OBJECTIVE BOX
HPI:  51 yo F w/ PMH  DM, UTI, left  Kidney Stones s/p lithotripsy , Adult PCKD,  p/w left flank pain, dysuria x 1 week. Pt was here on 7/7/18 for similar complaints and was discharge on PO abx. PT does not remember which antibiotic but states that completed about 7 days.  Symptoms improve but then again started again. Pt went to [facility] but due to long wait left the hospital. Today she comes again with dysuria, suprapubic pan and left flank pain.  At Ed pt was found to have positive UA. Abd CT shows concerns of left sided pyelonephritis. Pt denies CP, SOB, N/V, diarrhea or any other complaints.    LAst LMP 3 years ago (30 Jun 2018 09:55)      Patient is a 52y old  Female who presents with a chief complaint of dysuria and left flank pain (30 Jun 2018 09:55)      INTERVAL HPI/OVERNIGHT EVENTS:  T(C): 36.9 (07-03-18 @ 07:45), Max: 36.9 (07-03-18 @ 07:45)  HR: 72 (07-03-18 @ 07:45) (70 - 77)  BP: 110/68 (07-03-18 @ 07:45) (102/66 - 110/68)  RR: 16 (07-03-18 @ 07:45) (16 - 16)  SpO2: 96% (07-03-18 @ 07:45) (96% - 100%)  Wt(kg): --  I&O's Summary      REVIEW OF SYSTEMS: denies fever, chills, SOB, palpitations, chest pain, abdominal pain, nausea, vomitting, diarrhea, constipation, dizziness    MEDICATIONS  (STANDING):  cefTRIAXone   IVPB 1 Gram(s) IV Intermittent every 24 hours  dextrose 5%. 1000 milliLiter(s) (50 mL/Hr) IV Continuous <Continuous>  dextrose 50% Injectable 12.5 Gram(s) IV Push once  dextrose 50% Injectable 25 Gram(s) IV Push once  dextrose 50% Injectable 25 Gram(s) IV Push once  insulin glargine Injectable (LANTUS) 10 Unit(s) SubCutaneous at bedtime  insulin lispro (HumaLOG) corrective regimen sliding scale   SubCutaneous Before meals and at bedtime  lisinopril 5 milliGRAM(s) Oral daily  simvastatin 10 milliGRAM(s) Oral at bedtime  sodium chloride 0.9%. 1000 milliLiter(s) (125 mL/Hr) IV Continuous <Continuous>    MEDICATIONS  (PRN):  acetaminophen   Tablet 650 milliGRAM(s) Oral every 6 hours PRN For Temp greater than 38 C (100.4 F)  dextrose 40% Gel 15 Gram(s) Oral once PRN Blood Glucose LESS THAN 70 milliGRAM(s)/deciLiter  glucagon  Injectable 1 milliGRAM(s) IntraMuscular once PRN Glucose <70 milliGRAM(s)/deciLiter      PHYSICAL EXAM:  GENERAL: NAD, well-groomed, well-developed  HEAD:  Atraumatic, Normocephalic  EYES: EOMI, PERRLA, conjunctiva and sclera clear  ENMT: No tonsillar erythema, exudates, or enlargement; Moist mucous membranes, Good dentition, No lesions  NECK: Supple, No JVD, Normal thyroid  NERVOUS SYSTEM:  Alert & Oriented X3, Good concentration; Motor Strength 5/5 B/L upper and lower extremities; DTRs 2+ intact and symmetric  CHEST/LUNG: Clear to percussion bilaterally; No rales, rhonchi, wheezing, or rubs  HEART: Regular rate and rhythm; No murmurs, rubs, or gallops  ABDOMEN: Soft, Nontender, Nondistended; Bowel sounds present  EXTREMITIES:  2+ Peripheral Pulses, No clubbing, cyanosis, or edema  LYMPH: No lymphadenopathy noted  SKIN: No rashes or lesions  LABS:                        12.7   7.0   )-----------( 312      ( 03 Jul 2018 07:44 )             39.8     07-03    140  |  108  |  32<H>  ----------------------------<  162<H>  3.7   |  20<L>  |  1.76<H>    Ca    8.8      03 Jul 2018 07:44  Phos  3.7     07-03  Mg     2.1     07-03          CAPILLARY BLOOD GLUCOSE      POCT Blood Glucose.: 254 mg/dL (03 Jul 2018 11:37)  POCT Blood Glucose.: 166 mg/dL (03 Jul 2018 08:25)  POCT Blood Glucose.: 193 mg/dL (02 Jul 2018 20:59)  POCT Blood Glucose.: 139 mg/dL (02 Jul 2018 16:51)

## 2018-07-04 ENCOUNTER — TRANSCRIPTION ENCOUNTER (OUTPATIENT)
Age: 53
End: 2018-07-04

## 2018-07-04 VITALS
RESPIRATION RATE: 17 BRPM | DIASTOLIC BLOOD PRESSURE: 65 MMHG | SYSTOLIC BLOOD PRESSURE: 107 MMHG | HEART RATE: 73 BPM | OXYGEN SATURATION: 100 % | TEMPERATURE: 97 F

## 2018-07-04 LAB
ANION GAP SERPL CALC-SCNC: 7 MMOL/L — SIGNIFICANT CHANGE UP (ref 5–17)
BUN SERPL-MCNC: 33 MG/DL — HIGH (ref 7–18)
CALCIUM SERPL-MCNC: 9.3 MG/DL — SIGNIFICANT CHANGE UP (ref 8.4–10.5)
CHLORIDE SERPL-SCNC: 106 MMOL/L — SIGNIFICANT CHANGE UP (ref 96–108)
CO2 SERPL-SCNC: 25 MMOL/L — SIGNIFICANT CHANGE UP (ref 22–31)
CREAT SERPL-MCNC: 1.98 MG/DL — HIGH (ref 0.5–1.3)
GLUCOSE BLDC GLUCOMTR-MCNC: 168 MG/DL — HIGH (ref 70–99)
GLUCOSE BLDC GLUCOMTR-MCNC: 276 MG/DL — HIGH (ref 70–99)
GLUCOSE SERPL-MCNC: 154 MG/DL — HIGH (ref 70–99)
HCT VFR BLD CALC: 39.7 % — SIGNIFICANT CHANGE UP (ref 34.5–45)
HGB BLD-MCNC: 12.9 G/DL — SIGNIFICANT CHANGE UP (ref 11.5–15.5)
MAGNESIUM SERPL-MCNC: 2.1 MG/DL — SIGNIFICANT CHANGE UP (ref 1.6–2.6)
MCHC RBC-ENTMCNC: 28.4 PG — SIGNIFICANT CHANGE UP (ref 27–34)
MCHC RBC-ENTMCNC: 32.5 GM/DL — SIGNIFICANT CHANGE UP (ref 32–36)
MCV RBC AUTO: 87.6 FL — SIGNIFICANT CHANGE UP (ref 80–100)
PHOSPHATE SERPL-MCNC: 5.3 MG/DL — HIGH (ref 2.5–4.5)
PLATELET # BLD AUTO: 320 K/UL — SIGNIFICANT CHANGE UP (ref 150–400)
POTASSIUM SERPL-MCNC: 4 MMOL/L — SIGNIFICANT CHANGE UP (ref 3.5–5.3)
POTASSIUM SERPL-SCNC: 4 MMOL/L — SIGNIFICANT CHANGE UP (ref 3.5–5.3)
RBC # BLD: 4.53 M/UL — SIGNIFICANT CHANGE UP (ref 3.8–5.2)
RBC # FLD: 11.9 % — SIGNIFICANT CHANGE UP (ref 10.3–14.5)
SODIUM SERPL-SCNC: 138 MMOL/L — SIGNIFICANT CHANGE UP (ref 135–145)
WBC # BLD: 6.9 K/UL — SIGNIFICANT CHANGE UP (ref 3.8–10.5)
WBC # FLD AUTO: 6.9 K/UL — SIGNIFICANT CHANGE UP (ref 3.8–10.5)

## 2018-07-04 PROCEDURE — 83735 ASSAY OF MAGNESIUM: CPT

## 2018-07-04 PROCEDURE — 80048 BASIC METABOLIC PNL TOTAL CA: CPT

## 2018-07-04 PROCEDURE — 80061 LIPID PANEL: CPT

## 2018-07-04 PROCEDURE — 84100 ASSAY OF PHOSPHORUS: CPT

## 2018-07-04 PROCEDURE — 82962 GLUCOSE BLOOD TEST: CPT

## 2018-07-04 PROCEDURE — 82607 VITAMIN B-12: CPT

## 2018-07-04 PROCEDURE — 96374 THER/PROPH/DIAG INJ IV PUSH: CPT

## 2018-07-04 PROCEDURE — 84702 CHORIONIC GONADOTROPIN TEST: CPT

## 2018-07-04 PROCEDURE — 81001 URINALYSIS AUTO W/SCOPE: CPT

## 2018-07-04 PROCEDURE — 74176 CT ABD & PELVIS W/O CONTRAST: CPT

## 2018-07-04 PROCEDURE — 84443 ASSAY THYROID STIM HORMONE: CPT

## 2018-07-04 PROCEDURE — 99285 EMERGENCY DEPT VISIT HI MDM: CPT | Mod: 25

## 2018-07-04 PROCEDURE — 87040 BLOOD CULTURE FOR BACTERIA: CPT

## 2018-07-04 PROCEDURE — 83036 HEMOGLOBIN GLYCOSYLATED A1C: CPT

## 2018-07-04 PROCEDURE — 85027 COMPLETE CBC AUTOMATED: CPT

## 2018-07-04 PROCEDURE — 87086 URINE CULTURE/COLONY COUNT: CPT

## 2018-07-04 PROCEDURE — 96375 TX/PRO/DX INJ NEW DRUG ADDON: CPT

## 2018-07-04 PROCEDURE — 82746 ASSAY OF FOLIC ACID SERUM: CPT

## 2018-07-04 RX ORDER — CEFUROXIME AXETIL 250 MG
1 TABLET ORAL
Qty: 18 | Refills: 0 | OUTPATIENT
Start: 2018-07-04 | End: 2018-07-12

## 2018-07-04 RX ADMIN — Medication 3: at 12:04

## 2018-07-04 RX ADMIN — Medication 1: at 10:04

## 2018-07-04 RX ADMIN — CEFTRIAXONE 100 GRAM(S): 500 INJECTION, POWDER, FOR SOLUTION INTRAMUSCULAR; INTRAVENOUS at 06:14

## 2018-07-04 NOTE — PROGRESS NOTE ADULT - SUBJECTIVE AND OBJECTIVE BOX
HPI:  53 yo F w/ PMH  DM, UTI, left  Kidney Stones s/p lithotripsy , Adult PCKD,  p/w left flank pain, dysuria x 1 week. Pt was here on 7/7/18 for similar complaints and was discharge on PO abx. PT does not remember which antibiotic but states that completed about 7 days.  Symptoms improve but then again started again. Pt went to [facility] but due to long wait left the hospital. Today she comes again with dysuria, suprapubic pan and left flank pain.  At Ed pt was found to have positive UA. Abd CT shows concerns of left sided pyelonephritis. Pt denies CP, SOB, N/V, diarrhea or any other complaints.    LAst LMP 3 years ago (30 Jun 2018 09:55)      Patient is a 52y old  Female who presents with a chief complaint of dysuria and left flank pain (30 Jun 2018 09:55)      INTERVAL HPI/OVERNIGHT EVENTS:  T(C): 36.6 (07-04-18 @ 08:25), Max: 36.6 (07-04-18 @ 00:02)  HR: 73 (07-04-18 @ 08:25) (63 - 76)  BP: 118/58 (07-04-18 @ 08:25) (105/60 - 118/58)  RR: 17 (07-04-18 @ 08:25) (16 - 18)  SpO2: 99% (07-04-18 @ 08:25) (97% - 99%)  Wt(kg): --  I&O's Summary      REVIEW OF SYSTEMS: denies fever, chills, SOB, palpitations, chest pain, abdominal pain, nausea, vomitting, diarrhea, constipation, dizziness    MEDICATIONS  (STANDING):  cefTRIAXone   IVPB 1 Gram(s) IV Intermittent every 24 hours  dextrose 5%. 1000 milliLiter(s) (50 mL/Hr) IV Continuous <Continuous>  dextrose 50% Injectable 12.5 Gram(s) IV Push once  dextrose 50% Injectable 25 Gram(s) IV Push once  dextrose 50% Injectable 25 Gram(s) IV Push once  insulin glargine Injectable (LANTUS) 10 Unit(s) SubCutaneous at bedtime  insulin lispro (HumaLOG) corrective regimen sliding scale   SubCutaneous Before meals and at bedtime  lisinopril 5 milliGRAM(s) Oral daily  simvastatin 10 milliGRAM(s) Oral at bedtime  sodium chloride 0.9%. 1000 milliLiter(s) (125 mL/Hr) IV Continuous <Continuous>    MEDICATIONS  (PRN):  acetaminophen   Tablet 650 milliGRAM(s) Oral every 6 hours PRN For Temp greater than 38 C (100.4 F)  dextrose 40% Gel 15 Gram(s) Oral once PRN Blood Glucose LESS THAN 70 milliGRAM(s)/deciLiter  glucagon  Injectable 1 milliGRAM(s) IntraMuscular once PRN Glucose <70 milliGRAM(s)/deciLiter      PHYSICAL EXAM:  GENERAL: NAD, well-groomed, well-developed  HEAD:  Atraumatic, Normocephalic  EYES: EOMI, PERRLA, conjunctiva and sclera clear  ENMT: No tonsillar erythema, exudates, or enlargement; Moist mucous membranes, Good dentition, No lesions  NECK: Supple, No JVD, Normal thyroid  NERVOUS SYSTEM:  Alert & Oriented X3, Good concentration; Motor Strength 5/5 B/L upper and lower extremities; DTRs 2+ intact and symmetric  CHEST/LUNG: Clear to percussion bilaterally; No rales, rhonchi, wheezing, or rubs  HEART: Regular rate and rhythm; No murmurs, rubs, or gallops  ABDOMEN: Soft, Nontender, Nondistended; Bowel sounds present  EXTREMITIES:  2+ Peripheral Pulses, No clubbing, cyanosis, or edema  LYMPH: No lymphadenopathy noted  SKIN: No rashes or lesions  LABS:                        12.9   6.9   )-----------( 320      ( 04 Jul 2018 05:37 )             39.7     07-04    138  |  106  |  33<H>  ----------------------------<  154<H>  4.0   |  25  |  1.98<H>    Ca    9.3      04 Jul 2018 05:37  Phos  5.3     07-04  Mg     2.1     07-04          CAPILLARY BLOOD GLUCOSE      POCT Blood Glucose.: 276 mg/dL (04 Jul 2018 11:45)  POCT Blood Glucose.: 168 mg/dL (04 Jul 2018 08:31)  POCT Blood Glucose.: 207 mg/dL (03 Jul 2018 22:09)  POCT Blood Glucose.: 158 mg/dL (03 Jul 2018 16:53)

## 2018-07-04 NOTE — PROGRESS NOTE ADULT - ASSESSMENT
seen and examined  comfortable at edge of bed  no pain, no dysurea, had minimal urine mix hematurea.   vs stable afebrile  physical unchanged  lt renal angle tenderness almost gone.   no nausea  afebrile    wbc nml  creatinine up to 1.98.  urologist wants ct abd with contrast.  ileft message with urologist, then i  called nephrologist, as per him no need of ct again or cystoscopy, possible hematurea is from cyst.  as per nephro, pt can be discharged to home and can f/u his nephrologist and pcp at [facility].  will d/c pt home  ceftin bid for total of 10 days   f/u pcp/ nephro  watch creatinine     pt advised to loose wt  endocrinologist f/u  also advised when she has to call to pcp or go to er.  d/w her  bedside.

## 2018-07-04 NOTE — DISCHARGE NOTE ADULT - CARE PLAN
Principal Discharge DX:	Pyelonephritis  Goal:	Resolution of symptoms  Assessment and plan of treatment:	You were admitted for kidney infection for fever, flank tenderness. Your bacterial and urine cultures were negative. Abdominal CT Scan showed PCKD. You were treated with IVF, antibiotics. You improved clinically and afebrile. Please come to hospital if you have fever, blood in urine. Follow up with your Primary medical doctor, Nephrologist and Urologist as outpatient.  Secondary Diagnosis:	CKD (chronic kidney disease)  Assessment and plan of treatment:	Your creatinine is trending up 1.8 (Baseline 1.6) may be due to lisinopril.   Please avoid NSAIDS.  Continue with renal diet.  Follow up with your Primary medical doctor  Secondary Diagnosis:	Type 2 diabetes mellitus without complication, without long-term current use of insulin  Assessment and plan of treatment:	Hba1c 9 and creatinine is trending up due to diabetes.  monitor blood sugars, Hba1c.  Follow up with your Primary medical doctor    continue with home medications.  Follow up with your Primary medical doctor  Secondary Diagnosis:	Hypokalemia  Assessment and plan of treatment:	Your potassium was low. We replaced with potassium. Follow up with your Primary medical doctor  Secondary Diagnosis:	Polycystic kidney disease  Assessment and plan of treatment:	You had blood in urine due to PCKD which was diagnosed with CT Scan.  F/U  Nephrologist as out patient  Continue with renal diet

## 2018-07-04 NOTE — DISCHARGE NOTE ADULT - PLAN OF CARE
Your creatinine is trending up 1.8 (Baseline 1.6) may be due to lisinopril.   Please avoid NSAIDS.  Continue with renal diet.  Follow up with your Primary medical doctor Hba1c 9 and creatinine is trending up due to diabetes.  monitor blood sugars, Hba1c.  Follow up with your Primary medical doctor    continue with home medications.  Follow up with your Primary medical doctor Your potassium was low. We replaced with potassium. Follow up with your Primary medical doctor You had blood in urine due to PCKD which was diagnosed with CT Scan.  F/U  Nephrologist as out patient  Continue with renal diet Resolution of symptoms You were admitted for kidney infection for fever, flank tenderness. Your bacterial and urine cultures were negative. Abdominal CT Scan showed PCKD. You were treated with IVF, antibiotics. You improved clinically and afebrile. Please come to hospital if you have fever, blood in urine. Follow up with your Primary medical doctor, Nephrologist and Urologist as outpatient.

## 2018-07-04 NOTE — DISCHARGE NOTE ADULT - MEDICATION SUMMARY - MEDICATIONS TO STOP TAKING
I will STOP taking the medications listed below when I get home from the hospital:    acyclovir 800 mg oral tablet  -- 1 tab(s) by mouth 4 times a day  -- Finish all this medication unless otherwise directed by prescriber.  It is very important that you take or use this exactly as directed.  Do not skip doses or discontinue unless directed by your doctor.

## 2018-07-04 NOTE — DISCHARGE NOTE ADULT - SECONDARY DIAGNOSIS.
Type 2 diabetes mellitus without complication, without long-term current use of insulin Hypokalemia Polycystic kidney disease CKD (chronic kidney disease)

## 2018-07-04 NOTE — DISCHARGE NOTE ADULT - HOSPITAL COURSE
51 yo F w/ PMH  DM, UTI, left  Kidney Stones s/p lithotripsy , Adult PCKD,  p/w left flank pain, dysuria x 1 week. Pt was here on 7/7/18 for similar complaints and was discharge on PO abx. PT does not remember which antibiotic but states that completed about 7 days.  Symptoms improve but then again started again. Pt went to [facility] but due to long wait left the hospital. Today she comes again with dysuria, suprapubic pan and left flank pain.  At Ed pt was found to have positive UA. Abd CT shows concerns of left sided pyelonephritis. Pt denies CP, SOB, N/V, diarrhea or any other complaints.  Plan of care was discussed with patient and /or primary care giver; all questions and concerns were addressed and care was aligned with patient's wishes.    Hospital Course:  Patient with  PMH  DM, UTI, left  Kidney Stones s/p lithotripsy , Adult PCKD,  p/w left flank pain, dysuria x 1 week. She was admitted for Pyelonephritis with left flank tenderness . She was febrile, Leucocytosis, Urine and bacterial cultures are negative. Abd CT showed  concerns of left sided pyelonephritis. She was treated with IVF, Ceftriaxone. She improved clinically afebrile, leucocytosis trended down. Patient is hemodynamically stable. Follow up with your Primary medical doctor

## 2018-07-04 NOTE — PROGRESS NOTE ADULT - SUBJECTIVE AND OBJECTIVE BOX
flank pain resolved. continues to have hematuria on and off.    No Known Allergies    Hospital Medications:   MEDICATIONS  (STANDING):  cefTRIAXone   IVPB 1 Gram(s) IV Intermittent every 24 hours  dextrose 5%. 1000 milliLiter(s) (50 mL/Hr) IV Continuous <Continuous>  dextrose 50% Injectable 12.5 Gram(s) IV Push once  dextrose 50% Injectable 25 Gram(s) IV Push once  dextrose 50% Injectable 25 Gram(s) IV Push once  insulin glargine Injectable (LANTUS) 10 Unit(s) SubCutaneous at bedtime  insulin lispro (HumaLOG) corrective regimen sliding scale   SubCutaneous Before meals and at bedtime  lisinopril 5 milliGRAM(s) Oral daily  simvastatin 10 milliGRAM(s) Oral at bedtime  sodium chloride 0.9%. 1000 milliLiter(s) (125 mL/Hr) IV Continuous <Continuous>      VITALS:  T(F): 97.9 (18 @ 08:25), Max: 97.9 (18 @ 00:02)  HR: 73 (18 @ 08:25)  BP: 118/58 (18 @ 08:25)  RR: 17 (18 @ 08:25)  SpO2: 99% (18 @ 08:25)  Wt(kg): --      PHYSICAL EXAM:  Constitutional: NAD  HEENT: anicteric sclera, oropharynx clear, MMM  Neck: No JVD  Respiratory: CTAB, no wheezes, rales or rhonchi  Cardiovascular: S1, S2, RRR  Gastrointestinal: BS+, soft, NT. suprapubic tenderss  Extremities: No cyanosis or clubbing. No peripheral edema  Neurological: A/O x 3, no focal deficits  Psychiatric: Normal mood, normal affect  : No CVA tenderness. No larson.       LABS:      138  |  106  |  33<H>  ----------------------------<  154<H>  4.0   |  25  |  1.98<H>    Ca    9.3      2018 05:37  Phos  5.3     -  Mg     2.1     -      Creatinine Trend: 1.98 <--, 1.76 <--, 1.78 <--, 1.69 <--, 1.67 <--                        12.9   6.9   )-----------( 320      ( 2018 05:37 )             39.7     Urine Studies:  Urinalysis Basic - ( 2018 04:02 )    Color: Yellow / Appearance: Slightly Turbid / S.010 / pH:   Gluc:  / Ketone: Trace  / Bili: Negative / Urobili: 1   Blood:  / Protein: 100 / Nitrite: Positive   Leuk Esterase: Moderate / RBC: 10-25 /HPF / WBC 11-25 /HPF   Sq Epi:  / Non Sq Epi: Few /HPF / Bacteria: Many /HPF        RADIOLOGY & ADDITIONAL STUDIES:

## 2018-07-04 NOTE — DISCHARGE NOTE ADULT - MEDICATION SUMMARY - MEDICATIONS TO TAKE
I will START or STAY ON the medications listed below when I get home from the hospital:    Percocet 5/325  -- 1 tab(s) by mouth 4 times a day, As Needed MDD:4  -- Indication: For Pyelonephritis    Percocet 5/325  -- 1 tab(s) by mouth 4 times a day, As Needed MDD:4  -- Indication: For Pyelonephritis    Aspirin Enteric Coated 81 mg oral delayed release tablet  -- 1 tab(s) by mouth once a day  -- Indication: For CKD (chronic kidney disease)    acetaminophen-oxyCODONE 325 mg-5 mg oral tablet  -- 1 tab(s) by mouth every 6 hours, As Needed - for severe pain  -- Caution federal law prohibits the transfer of this drug to any person other  than the person for whom it was prescribed.  May cause drowsiness.  Alcohol may intensify this effect.  Use care when operating dangerous machinery.  This prescription cannot be refilled.  This product contains acetaminophen.  Do not use  with any other product containing acetaminophen to prevent possible liver damage.  Using more of this medication than prescribed may cause serious breathing problems.    -- Indication: For Pckd    lisinopril 5 mg oral tablet  -- 1 tab(s) by mouth once a day  -- Indication: For Hypertension    repaglinide 1 mg oral tablet  -- 1 tab(s) by mouth 3 times a day (before meals)  -- Indication: For Diabetes    Glucophage  mg oral tablet, extended release  -- 1 tab(s) by mouth once a day  -- Check with your doctor before becoming pregnant.  Do not drink alcoholic beverages when taking this medication.  It is very important that you take or use this exactly as directed.  Do not skip doses or discontinue unless directed by your doctor.  Obtain medical advice before taking any non-prescription drugs as some may affect the action of this medication.  Swallow whole.  Do not crush.  Take with food or milk.    -- Indication: For Diabetes    SITagliptin 50 mg oral tablet  -- 1 tab(s) by mouth once a day  -- Indication: For Diabetes    simvastatin 10 mg oral tablet  -- 1 tab(s) by mouth once a day (at bedtime)  -- Indication: For Diabetes    ProAir HFA 90 mcg/inh inhalation aerosol  -- 2 puff(s) inhaled every 6 hours prn cough  -- For inhalation only.  It is very important that you take or use this exactly as directed.  Do not skip doses or discontinue unless directed by your doctor.  Obtain medical advice before taking any non-prescription drugs as some may affect the action of this medication.  Shake well before use.    -- Indication: For asthma    Monistat 3 vaginal suppository  -- 1 suppository(ies) vaginally once a day (at bedtime)  -- Finish all this medication unless otherwise directed by prescriber.  For vaginal use.    -- Indication: For Candidiasis

## 2018-07-04 NOTE — DISCHARGE NOTE ADULT - PATIENT PORTAL LINK FT
You can access the [facility] Patient Portal, offered by [facility], by registering with the following website: [email or web address]

## 2018-07-04 NOTE — PROGRESS NOTE ADULT - ASSESSMENT
52 yr old female with JOSIAH on CKD sec to APCKD.  Admitted with acute pyelonephritis    rec's  renal function relatively stable   pyelo symptoms have resolved.  pt has acute on chronic hematuria likely from a ruptured and stones  pt can be discharged and follow with nephrology at [facility] ( has appointment in August). can re-schedule appointment sooner upon discharge

## 2018-07-05 LAB
CULTURE RESULTS: SIGNIFICANT CHANGE UP
SPECIMEN SOURCE: SIGNIFICANT CHANGE UP

## 2018-07-06 LAB
CULTURE RESULTS: SIGNIFICANT CHANGE UP
SPECIMEN SOURCE: SIGNIFICANT CHANGE UP

## 2018-07-15 ENCOUNTER — APPOINTMENT (OUTPATIENT)
Dept: CT IMAGING | Facility: HOSPITAL | Age: 53
End: 2018-07-15
Attending: EMERGENCY MEDICINE
Payer: MEDICAID

## 2018-07-15 ENCOUNTER — APPOINTMENT (OUTPATIENT)
Dept: GENERAL RADIOLOGY | Facility: HOSPITAL | Age: 53
End: 2018-07-15
Attending: EMERGENCY MEDICINE
Payer: MEDICAID

## 2018-07-15 ENCOUNTER — HOSPITAL ENCOUNTER (EMERGENCY)
Facility: HOSPITAL | Age: 53
Discharge: HOME | End: 2018-07-16
Attending: EMERGENCY MEDICINE
Payer: MEDICAID

## 2018-07-15 DIAGNOSIS — S52.502A CLOSED FRACTURE OF DISTAL END OF LEFT RADIUS, UNSPECIFIED FRACTURE MORPHOLOGY, INITIAL ENCOUNTER: Primary | ICD-10-CM
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
## 2018-07-15 PROCEDURE — 29125 APPL SHORT ARM SPLINT STATIC: CPT

## 2018-07-15 PROCEDURE — 72072 X-RAY EXAM THORAC SPINE 3VWS: CPT | Performed by: EMERGENCY MEDICINE

## 2018-07-15 PROCEDURE — 72125 CT NECK SPINE W/O DYE: CPT | Performed by: EMERGENCY MEDICINE

## 2018-07-15 PROCEDURE — 99284 EMERGENCY DEPT VISIT MOD MDM: CPT

## 2018-07-15 PROCEDURE — 73110 X-RAY EXAM OF WRIST: CPT | Performed by: EMERGENCY MEDICINE

## 2018-07-15 PROCEDURE — 72100 X-RAY EXAM L-S SPINE 2/3 VWS: CPT | Performed by: EMERGENCY MEDICINE

## 2018-07-15 RX ORDER — TRAMADOL HYDROCHLORIDE 50 MG/1
50 TABLET ORAL ONCE
Status: COMPLETED | OUTPATIENT
Start: 2018-07-15 | End: 2018-07-15

## 2018-07-16 VITALS
WEIGHT: 150 LBS | BODY MASS INDEX: 28.32 KG/M2 | HEIGHT: 61 IN | OXYGEN SATURATION: 94 % | HEART RATE: 73 BPM | SYSTOLIC BLOOD PRESSURE: 157 MMHG | TEMPERATURE: 99 F | RESPIRATION RATE: 20 BRPM | DIASTOLIC BLOOD PRESSURE: 102 MMHG

## 2018-07-16 RX ORDER — TRAMADOL HYDROCHLORIDE 50 MG/1
50 TABLET ORAL EVERY 8 HOURS PRN
Qty: 15 TABLET | Refills: 0 | Status: SHIPPED | OUTPATIENT
Start: 2018-07-16 | End: 2018-07-21

## 2018-07-16 RX ORDER — DOCUSATE SODIUM 100 MG/1
100 CAPSULE, LIQUID FILLED ORAL 2 TIMES DAILY
Qty: 20 CAPSULE | Refills: 0 | Status: SHIPPED | OUTPATIENT
Start: 2018-07-16 | End: 2018-07-26

## 2018-07-16 NOTE — ED INITIAL ASSESSMENT (HPI)
Pt was walking in [facility] as she slipped on the wet floor at 20:25. Pt  Did the split during the fall & landed on her bilat. Hands. No LOC. C/O bilat. Wrist pain, lower back & neck pain.

## 2018-07-16 NOTE — ED PROVIDER NOTES
Patient Seen in: [facility] AND [facility] Emergency Department    History   Patient presents with:  Fall (musculoskeletal, neurologic)    Stated Complaint:  fall    HPI    45 yo F with PMH HTN presenting via EMS for evaluation of slip/fall.  Was walking through  for rash. No itching    Neurological: Negative for syncope and headaches. Psychiatric/Behavioral: Negative for agitation. The patient is not hyperactive. Positive for stated complaint:   Other systems are as noted in HPI.   Constitutional and mary swelling  Ulna appears intact. Carpal bone alignment appears intact. No other fractures. Degenerative changes are noted at the first and second carpometacarpal joints      Case discussed with Dr Bruno Dunaway @ [phone] pm Vita Frias M.D.   This report thoracic or lumbar spine. Visualized ribs and pelvis also appear unremarkable. - Degenerative changes at L5-S1 with facet arthropathy  - Visualized lungs and soft tissues unremarkable. Case faxed to ED at 12:37 AM CT .  If there are any questions, pl distal radius fracture. Splint placed without complication, DC home with analgesia and PCP/ortho followup.     Disposition and Plan     Clinical Impression:  Closed fracture of distal end of left radius, unspecified fracture morphology, initial encounter  (

## 2018-07-19 ENCOUNTER — OFFICE VISIT (OUTPATIENT)
Dept: FAMILY MEDICINE CLINIC | Facility: CLINIC | Age: 53
End: 2018-07-19

## 2018-07-19 VITALS
DIASTOLIC BLOOD PRESSURE: 123 MMHG | SYSTOLIC BLOOD PRESSURE: 175 MMHG | HEART RATE: 75 BPM | TEMPERATURE: 99 F | WEIGHT: 152 LBS | BODY MASS INDEX: 29 KG/M2

## 2018-07-19 DIAGNOSIS — S52.502A CLOSED FRACTURE OF DISTAL END OF LEFT RADIUS, UNSPECIFIED FRACTURE MORPHOLOGY, INITIAL ENCOUNTER: Primary | ICD-10-CM

## 2018-07-19 DIAGNOSIS — S39.012A BACK STRAIN, INITIAL ENCOUNTER: ICD-10-CM

## 2018-07-19 DIAGNOSIS — S13.9XXA NECK SPRAIN, INITIAL ENCOUNTER: ICD-10-CM

## 2018-07-19 PROCEDURE — 99214 OFFICE O/P EST MOD 30 MIN: CPT | Performed by: FAMILY MEDICINE

## 2018-07-19 PROCEDURE — 99212 OFFICE O/P EST SF 10 MIN: CPT | Performed by: FAMILY MEDICINE

## 2018-07-19 RX ORDER — CYCLOBENZAPRINE HCL 10 MG
10 TABLET ORAL 3 TIMES DAILY
Qty: 30 TABLET | Refills: 1 | Status: SHIPPED | OUTPATIENT
Start: 2018-07-19 | End: 2018-08-08

## 2018-07-19 RX ORDER — OMEGA-3-ACID ETHYL ESTERS 1 G/1
CAPSULE, LIQUID FILLED ORAL
Qty: 180 CAPSULE | Refills: 4 | Status: SHIPPED | OUTPATIENT
Start: 2018-07-19

## 2018-07-19 RX ORDER — BENAZEPRIL HYDROCHLORIDE 20 MG/1
20 TABLET ORAL DAILY
Qty: 90 TABLET | Refills: 1 | Status: SHIPPED | OUTPATIENT
Start: 2018-07-19 | End: 2019-06-09

## 2018-07-19 NOTE — PROGRESS NOTES
7/19/2018  10:02 AM    Roger Aguilar is a 46year old female. Chief complaint(s): Patient presents with:  ER F/U: patient was in the ER substained a fall in the store and has a closed fx left arm.  Also c/o neck pain, knee pain and lower back it with food. Disp: 60 tablet Rfl: 1   Cyclobenzaprine HCl 10 MG Oral Tab Take 1 tablet (10 mg total) by mouth 3 (three) times daily. Disp: 30 tablet Rfl: 1   Benazepril HCl (LOTENSIN) 20 MG Oral Tab Take 1 tablet (20 mg total) by mouth daily.  Disp: 90 tab breath sounds normal. She has no wheezes. She has no rales. Musculoskeletal:   + left radial wrist tenderness   LUE with a splint and arm sling    Lymphadenopathy:     She has no cervical adenopathy. Skin: No rash noted.      LABORATORY RESULTS:     EKG degeneration between the anterior arch of C1 and the odontoid process. There is no rotational abnormality of the atlantoaxial articulation. Mild uncovertebral joint hypertrophy is present at the lower cervical levels. PARASPINAL AREA: No visible mass.   OTH today.  TECHNIQUE: Three views were obtained. FINDINGS:  BONES: Moderate to severe osteoarthritic change is seen about the first Aia 16 joints bilaterally. No fracture on the right.  Small area of cortical irregularity about the articular surface of the dista there is a deterioration or worsening of the medical condition. Also, inform the doctor with any new symptoms or medications' side effects. RICE therapy. FOLLOW-UP: Schedule a follow-up visit in 2 months.          Orders This Visit:  No orders of the de

## 2018-07-23 ENCOUNTER — TELEPHONE (OUTPATIENT)
Dept: FAMILY MEDICINE CLINIC | Facility: CLINIC | Age: 53
End: 2018-07-23

## 2018-09-22 NOTE — PROGRESS NOTES
H&P done.   FALGUNI TINOCO PA-C on 9/22/2018 at 12:50 PM Please call patient, results are within normal limits.

## 2019-06-10 RX ORDER — BENAZEPRIL HYDROCHLORIDE 20 MG/1
TABLET ORAL
Qty: 90 TABLET | Refills: 0 | Status: SHIPPED | OUTPATIENT
Start: 2019-06-10

## 2021-02-19 NOTE — ED PROVIDER NOTE - GASTROINTESTINAL, MLM
Dx: Lumbar radiculopathy L         Insurance (Authorized # of Visits):  8           Authorizing Physician: Dr. Rosalio Charlton  Next MD visit: none scheduled  Fall Risk: standard         Precautions: n/a             Subjective: Sometimes feels some back pain - laundr provoked) Manual therapy:  L4/5 L UPA grade III+, to P1  STM to QL, paraspinals (P1)  -no pain rotation L   Therex:  Review of squat mechanics, laundry mechanics tips  Hip hinge squatting with dowel keshav x20  Lumbar ext iso holds over SB 20x5s  Weight shift Abdomen soft, non-tender, no guarding.

## 2021-10-04 ENCOUNTER — HOSPITAL ENCOUNTER (EMERGENCY)
Facility: HOSPITAL | Age: 56
Discharge: HOME | End: 2021-10-04
Attending: EMERGENCY MEDICINE
Payer: MEDICAID

## 2021-10-04 ENCOUNTER — APPOINTMENT (OUTPATIENT)
Dept: CT IMAGING | Facility: HOSPITAL | Age: 56
End: 2021-10-04
Attending: EMERGENCY MEDICINE
Payer: MEDICAID

## 2021-10-04 VITALS
HEIGHT: 61 IN | SYSTOLIC BLOOD PRESSURE: 132 MMHG | OXYGEN SATURATION: 99 % | BODY MASS INDEX: 28.32 KG/M2 | DIASTOLIC BLOOD PRESSURE: 77 MMHG | HEART RATE: 72 BPM | TEMPERATURE: 98 F | RESPIRATION RATE: 18 BRPM | WEIGHT: 150 LBS

## 2021-10-04 DIAGNOSIS — R10.13 EPIGASTRIC PAIN: Primary | ICD-10-CM

## 2021-10-04 PROCEDURE — 96374 THER/PROPH/DIAG INJ IV PUSH: CPT

## 2021-10-04 PROCEDURE — 83690 ASSAY OF LIPASE: CPT | Performed by: EMERGENCY MEDICINE

## 2021-10-04 PROCEDURE — 80053 COMPREHEN METABOLIC PANEL: CPT | Performed by: EMERGENCY MEDICINE

## 2021-10-04 PROCEDURE — 93005 ELECTROCARDIOGRAM TRACING: CPT

## 2021-10-04 PROCEDURE — S0028 INJECTION, FAMOTIDINE, 20 MG: HCPCS | Performed by: EMERGENCY MEDICINE

## 2021-10-04 PROCEDURE — 85025 COMPLETE CBC W/AUTO DIFF WBC: CPT | Performed by: EMERGENCY MEDICINE

## 2021-10-04 PROCEDURE — 85379 FIBRIN DEGRADATION QUANT: CPT | Performed by: EMERGENCY MEDICINE

## 2021-10-04 PROCEDURE — 74177 CT ABD & PELVIS W/CONTRAST: CPT | Performed by: EMERGENCY MEDICINE

## 2021-10-04 PROCEDURE — 99285 EMERGENCY DEPT VISIT HI MDM: CPT

## 2021-10-04 PROCEDURE — 82248 BILIRUBIN DIRECT: CPT | Performed by: EMERGENCY MEDICINE

## 2021-10-04 PROCEDURE — 71260 CT THORAX DX C+: CPT | Performed by: EMERGENCY MEDICINE

## 2021-10-04 PROCEDURE — 93010 ELECTROCARDIOGRAM REPORT: CPT | Performed by: INTERNAL MEDICINE

## 2021-10-04 RX ORDER — FAMOTIDINE 40 MG/1
40 TABLET, FILM COATED ORAL DAILY
Qty: 30 TABLET | Refills: 0 | Status: SHIPPED | OUTPATIENT
Start: 2021-10-04 | End: 2021-11-03

## 2021-10-04 RX ORDER — FAMOTIDINE 10 MG/ML
20 INJECTION, SOLUTION INTRAVENOUS ONCE
Status: COMPLETED | OUTPATIENT
Start: 2021-10-04 | End: 2021-10-04

## 2021-10-04 NOTE — ED INITIAL ASSESSMENT (HPI)
Leopoldo Bidding  used # [id number]    Pain to LUQ , worse with deep breaths. Started Sunday morning.   Denies n/v/d  Denies cp/sob

## 2021-10-04 NOTE — ED PROVIDER NOTES
Patient Seen in: [facility] AND [facility] Emergency Department      History   Patient presents with:  Abdominal Pain    Stated Complaint: abd pain    Subjective:   HPI    70-year-old female with hypertension, hyperlipidemia, depression, presents for evaluation Pulmonary effort is normal.      Breath sounds: Normal breath sounds. Abdominal:      General: There is no distension. Palpations: Abdomen is soft. Tenderness: There is abdominal tenderness in the epigastric area and left upper quadrant.  There significant change compared to prior EKG from February 14, 2018              CT ABDOMEN+PELVIS(CONTRAST ONLY)(CPT=74177)    Result Date: 10/4/2021  CONCLUSION:  1. No acute intra-abdominal process is identified.  The etiology of the patient's symptoms is un blood pressure. I suspect there may be a component of gastritis and she will be started on short course of Pepcid and we did discuss dietary modifications and alcohol avoidance.   I encourage close outpatient follow-up and return precautions for severe w

## 2022-07-30 NOTE — PROGRESS NOTE ADULT - PROBLEM/PLAN-1
· Had increased anxiety  · Home Klonopin was increased from 1 mg daily prn to twice daily prn at [facility] prior to transfer  · Was also begun on Seroquel at [facility] which was discontinued after dose last night due to new rash DISPLAY PLAN FREE TEXT

## 2024-08-13 NOTE — DISCHARGE NOTE ADULT - FUNCTIONAL SCREEN CURRENT LEVEL: DRESSING, MLM
[Dear  ___] : Dear  [unfilled], [I recently saw our patient [unfilled] for a follow-up visit.] : I recently saw our patient, [unfilled] for a follow-up visit. [Attached please find my note.] : Attached please find my note. [FreeTextEntry2] : She completed adjuvant vaginal brachytherapy and is clinically without evidence of disease and will see me regularly. She will see you for routine GYN care. Thank you again for referring this yosef patient. \par   (0) independent

## (undated) NOTE — LETTER
7/26/2017              Twilla Kaw[address]         Dear Rasheeda Valverde,    It was a pleasure to see you. Your mammogram was normal.  There is no need for further testing at this time.   I look forward to seeing you

## (undated) NOTE — MR AVS SNAPSHOT
[address], [address]  [address] [facility]  [phone]               Thank you for choosing us for your health care visit with Jamilah Cook MD.  We are glad to serve you and happy to provide you with this summ Visit [email or web address] to learn more. Educational Information     Healthy Diet and Regular Exercise  [employer] for making healthy food choices  -   Enjoy your food, but eat less.   Fully enjoy your food when ea

## (undated) NOTE — LETTER
2/19/2018              Giancarlo Acevedo [address]         Dear Beatris Cruz,    It was a pleasure to see you. Your ekg/electrocardiogram was normal.  There is no need for further testing at this time.   I look forward t

## (undated) NOTE — MR AVS SNAPSHOT
[address], [address]  [address] [facility]  [phone]               Thank you for choosing us for your health care visit with Joselin Garcia MD.  We are glad to serve you and happy to provide you with this summ Omega-3-acid Ethyl Esters 1 g Caps   TAKE 2 CAPSULES BY MOUTH 2 (TWO) TIMES DAILY.    Commonly known as:  Duke Chen                Where to Get Your Medications      These medications were sent to [facility] #[id number]- [address] may be held responsible for payment in full if proper authorization is not acquired. Please contact the Patient Business Office at [phone] if you have any questions related to insurance coverage. Thank you.          Results of Recent Testing     URIN

## (undated) NOTE — ED AVS SNAPSHOT
Sheila Mcleod   MRN: [id number]    Department:  [facility] Emergency Department   Date of Visit:  7/15/2018           Disclosure     Insurance plans vary and the physician(s) referred by the ER may not be covered by your plan.  Please c CARE PHYSICIAN AT ONCE OR RETURN IMMEDIATELY TO THE EMERGENCY DEPARTMENT. If you have been prescribed any medication(s), please fill your prescription right away and begin taking the medication(s) as directed.   If you believe that any of the medications

## (undated) NOTE — LETTER
February 17, 2018     [phone] [id number]      Dear Araceli Brumfield:    Below are the results from your recent visit:  results are within normal limits.    Resulted Orders   COMP METABOLIC PANEL (14)   Result Value Ref Range Glucose Urine Negative Negative mg/dL    Ketones Urine Negative Negative mg/dL    Bilirubin Urine Negative Negative    Blood Urine Small (A) Negative    Nitrite Urine Negative Negative    Urobilinogen Urine <2.0 <2.0    Leukocyte Esterase Urine Trace (A)